# Patient Record
Sex: MALE | Employment: OTHER | URBAN - METROPOLITAN AREA
[De-identification: names, ages, dates, MRNs, and addresses within clinical notes are randomized per-mention and may not be internally consistent; named-entity substitution may affect disease eponyms.]

---

## 2018-08-04 ENCOUNTER — APPOINTMENT (OUTPATIENT)
Dept: GENERAL RADIOLOGY | Age: 78
End: 2018-08-04
Attending: NURSE PRACTITIONER
Payer: MEDICARE

## 2018-08-04 ENCOUNTER — HOSPITAL ENCOUNTER (EMERGENCY)
Age: 78
Discharge: HOME | End: 2018-08-04
Attending: EMERGENCY MEDICINE | Admitting: EMERGENCY MEDICINE
Payer: MEDICARE

## 2018-08-04 ENCOUNTER — APPOINTMENT (OUTPATIENT)
Dept: CT IMAGING | Age: 78
End: 2018-08-04
Attending: NURSE PRACTITIONER
Payer: MEDICARE

## 2018-08-04 VITALS
BODY MASS INDEX: 26.66 KG/M2 | HEART RATE: 73 BPM | WEIGHT: 180 LBS | SYSTOLIC BLOOD PRESSURE: 108 MMHG | TEMPERATURE: 97.5 F | RESPIRATION RATE: 16 BRPM | HEIGHT: 69 IN | DIASTOLIC BLOOD PRESSURE: 75 MMHG | OXYGEN SATURATION: 100 %
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
## 2018-08-04 DIAGNOSIS — S00.81XA ABRASION OF FOREHEAD, INITIAL ENCOUNTER: ICD-10-CM

## 2018-08-04 LAB
ALBUMIN SERPL-MCNC: 4.3 G/DL (ref 3.4–5)
ALBUMIN/GLOB SERPL: 0.9 {RATIO} (ref 0.8–1.7)
ALP SERPL-CCNC: 71 U/L (ref 45–117)
ALT SERPL-CCNC: 24 U/L (ref 16–61)
ANION GAP SERPL CALC-SCNC: 6 MMOL/L (ref 3–18)
AST SERPL-CCNC: 29 U/L (ref 15–37)
BASOPHILS # BLD: 0 K/UL (ref 0–0.1)
BASOPHILS NFR BLD: 0 % (ref 0–2)
BILIRUB DIRECT SERPL-MCNC: 0.2 MG/DL (ref 0–0.2)
BILIRUB SERPL-MCNC: 0.6 MG/DL (ref 0.2–1)
BUN SERPL-MCNC: 14 MG/DL (ref 7–18)
BUN/CREAT SERPL: 9 (ref 12–20)
CALCIUM SERPL-MCNC: 9.5 MG/DL (ref 8.5–10.1)
CHLORIDE SERPL-SCNC: 102 MMOL/L (ref 100–108)
CK MB CFR SERPL CALC: NORMAL % (ref 0–4)
CK MB SERPL-MCNC: <1 NG/ML (ref 5–25)
CK SERPL-CCNC: 101 U/L (ref 39–308)
CO2 SERPL-SCNC: 32 MMOL/L (ref 21–32)
CREAT SERPL-MCNC: 1.52 MG/DL (ref 0.6–1.3)
DIFFERENTIAL METHOD BLD: ABNORMAL
EOSINOPHIL # BLD: 0.1 K/UL (ref 0–0.4)
EOSINOPHIL NFR BLD: 3 % (ref 0–5)
ERYTHROCYTE [DISTWIDTH] IN BLOOD BY AUTOMATED COUNT: 12.6 % (ref 11.6–14.5)
GLOBULIN SER CALC-MCNC: 4.8 G/DL (ref 2–4)
GLUCOSE SERPL-MCNC: 129 MG/DL (ref 74–99)
HCT VFR BLD AUTO: 39.8 % (ref 36–48)
HGB BLD-MCNC: 14 G/DL (ref 13–16)
LYMPHOCYTES # BLD: 0.5 K/UL (ref 0.9–3.6)
LYMPHOCYTES NFR BLD: 15 % (ref 21–52)
MCH RBC QN AUTO: 29.7 PG (ref 24–34)
MCHC RBC AUTO-ENTMCNC: 35.2 G/DL (ref 31–37)
MCV RBC AUTO: 84.5 FL (ref 74–97)
MONOCYTES # BLD: 0.4 K/UL (ref 0.05–1.2)
MONOCYTES NFR BLD: 13 % (ref 3–10)
NEUTS SEG # BLD: 2.1 K/UL (ref 1.8–8)
NEUTS SEG NFR BLD: 69 % (ref 40–73)
PLATELET # BLD AUTO: 104 K/UL (ref 135–420)
PMV BLD AUTO: 9 FL (ref 9.2–11.8)
POTASSIUM SERPL-SCNC: 3.8 MMOL/L (ref 3.5–5.5)
PROT SERPL-MCNC: 9.1 G/DL (ref 6.4–8.2)
RBC # BLD AUTO: 4.71 M/UL (ref 4.7–5.5)
SODIUM SERPL-SCNC: 140 MMOL/L (ref 136–145)
TROPONIN I SERPL-MCNC: <0.02 NG/ML (ref 0–0.04)
WBC # BLD AUTO: 3 K/UL (ref 4.6–13.2)

## 2018-08-04 PROCEDURE — 82550 ASSAY OF CK (CPK): CPT | Performed by: NURSE PRACTITIONER

## 2018-08-04 PROCEDURE — 80076 HEPATIC FUNCTION PANEL: CPT | Performed by: NURSE PRACTITIONER

## 2018-08-04 PROCEDURE — 99284 EMERGENCY DEPT VISIT MOD MDM: CPT

## 2018-08-04 PROCEDURE — 80048 BASIC METABOLIC PNL TOTAL CA: CPT | Performed by: NURSE PRACTITIONER

## 2018-08-04 PROCEDURE — 74011250636 HC RX REV CODE- 250/636: Performed by: EMERGENCY MEDICINE

## 2018-08-04 PROCEDURE — 90471 IMMUNIZATION ADMIN: CPT

## 2018-08-04 PROCEDURE — 71045 X-RAY EXAM CHEST 1 VIEW: CPT

## 2018-08-04 PROCEDURE — 85025 COMPLETE CBC W/AUTO DIFF WBC: CPT | Performed by: NURSE PRACTITIONER

## 2018-08-04 PROCEDURE — 93005 ELECTROCARDIOGRAM TRACING: CPT

## 2018-08-04 PROCEDURE — 70450 CT HEAD/BRAIN W/O DYE: CPT

## 2018-08-04 PROCEDURE — 90715 TDAP VACCINE 7 YRS/> IM: CPT | Performed by: EMERGENCY MEDICINE

## 2018-08-04 PROCEDURE — 74011000250 HC RX REV CODE- 250: Performed by: EMERGENCY MEDICINE

## 2018-08-04 RX ORDER — BACITRACIN 500 UNIT/G
2 PACKET (EA) TOPICAL 3 TIMES DAILY
Status: DISCONTINUED | OUTPATIENT
Start: 2018-08-04 | End: 2018-08-04 | Stop reason: HOSPADM

## 2018-08-04 RX ADMIN — TETANUS TOXOID, REDUCED DIPHTHERIA TOXOID AND ACELLULAR PERTUSSIS VACCINE, ADSORBED 0.5 ML: 5; 2.5; 8; 8; 2.5 SUSPENSION INTRAMUSCULAR at 14:02

## 2018-08-04 RX ADMIN — BACITRACIN 2 PACKET: 500 OINTMENT TOPICAL at 15:30

## 2018-08-04 NOTE — ED PROVIDER NOTES
HPI Comments: History Provided By: Patient Chief Complaint: laceration Duration:  PTA Timing:  acute Location: left forehead Severity: mild Modifying Factors: PT fell and hit his head Associated Symptoms: Denies neck pain, CP, and SOB. 1:13 PM Jarett Beckford is a 66 y.o. male with h/o HTN and DM who presents to ED complaining of acute mild left forehead laceration that occurred this morning after a mechanical fall. Pt is at baseline and is acting normally. He goes to the [facility]. Denies neck pain, CP, and SOB. Pt does not smoke or drink. PCP: Aureliano Painting MD 
 
 
The history is provided by the patient. Past Medical History:  
Diagnosis Date  Diabetes ([address]  Hypertension  Kidney problem History reviewed. No pertinent surgical history. Family History:  
Problem Relation Age of Onset  Diabetes Other  Hypertension Other Social History Social History  Marital status:  Spouse name: N/A  
 Number of children: N/A  
 Years of education: N/A Occupational History  Not on file. Social History Main Topics  Smoking status: Never Smoker  Smokeless tobacco: Never Used  Alcohol use No  
 Drug use: Not on file  Sexual activity: Not on file Other Topics Concern  Not on file Social History Narrative ALLERGIES: Review of patient's allergies indicates no known allergies. Review of Systems Respiratory: Negative for shortness of breath. Cardiovascular: Negative for chest pain. Musculoskeletal: Negative for neck pain. Skin: Positive for wound. All other systems reviewed and are negative. Vitals:  
 08/04/18 1250 08/04/18 1300 08/04/18 1315 08/04/18 1330 BP: 131/72 154/73 133/73 108/75 Pulse:      
Resp:      
Temp:      
SpO2:  100% 100% 100% Weight:      
Height:      
      
 
Physical Exam  
Constitutional: He is oriented to person, place, and time. He appears well-developed and well-nourished. No distress. HENT:  
Head: Normocephalic and atraumatic. Mouth/Throat: Oropharynx is clear and moist.  
Eyes: Conjunctivae and EOM are normal. Pupils are equal, round, and reactive to light. No scleral icterus. Neck: Normal range of motion. Neck supple. Cardiovascular: Normal rate, regular rhythm and normal heart sounds. No murmur heard. Pulmonary/Chest: Effort normal and breath sounds normal. No respiratory distress. Abdominal: Soft. Bowel sounds are normal. He exhibits no distension. There is no tenderness. Musculoskeletal: He exhibits no edema. Lymphadenopathy:  
  He has no cervical adenopathy. Neurological: He is alert and oriented to person, place, and time. Coordination normal.  
Skin: Skin is warm and dry. No rash noted. 5 x 2 CM abrasion to the left forehead Psychiatric: He has a normal mood and affect. His behavior is normal.  
Nursing note and vitals reviewed. MDM Number of Diagnoses or Management Options Abrasion of forehead, initial encounter:  
Fall, initial encounter:  
Diagnosis management comments: Unwitnessed fall at baseline per care giver ct and labs reviewed will dc home Amount and/or Complexity of Data Reviewed Clinical lab tests: ordered and reviewed Tests in the radiology section of CPT®: ordered and reviewed ED Course Procedures Vitals: 
Patient Vitals for the past 12 hrs: 
 Temp Pulse Resp BP SpO2  
08/04/18 1330 - - - 108/75 100 % 08/04/18 1315 - - - 133/73 100 % 08/04/18 1300 - - - 154/73 100 % 08/04/18 1250 - - - 131/72 -  
08/04/18 1242 97.5 °F (36.4 °C) 73 16 150/83 98 % Medications Ordered: 
Medications diph,Pertuss(AC),Tet Vac-PF (BOOSTRIX) suspension 0.5 mL (0.5 mL IntraMUSCular Given 8/4/18 1402) Lab Findings: 
Recent Results (from the past 12 hour(s)) CBC WITH AUTOMATED DIFF Collection Time: 08/04/18 12:48 PM  
Result Value Ref Range WBC 3.0 (L) 4.6 - 13.2 K/uL  
 RBC 4.71 4.70 - 5.50 M/uL HGB 14.0 13.0 - 16.0 g/dL HCT 39.8 36.0 - 48.0 % MCV 84.5 74.0 - 97.0 FL  
 MCH 29.7 24.0 - 34.0 PG  
 MCHC 35.2 31.0 - 37.0 g/dL  
 RDW 12.6 11.6 - 14.5 % PLATELET 531 (L) 848 - 420 K/uL MPV 9.0 (L) 9.2 - 11.8 FL  
 NEUTROPHILS 69 40 - 73 % LYMPHOCYTES 15 (L) 21 - 52 % MONOCYTES 13 (H) 3 - 10 % EOSINOPHILS 3 0 - 5 % BASOPHILS 0 0 - 2 %  
 ABS. NEUTROPHILS 2.1 1.8 - 8.0 K/UL  
 ABS. LYMPHOCYTES 0.5 (L) 0.9 - 3.6 K/UL  
 ABS. MONOCYTES 0.4 0.05 - 1.2 K/UL  
 ABS. EOSINOPHILS 0.1 0.0 - 0.4 K/UL  
 ABS. BASOPHILS 0.0 0.0 - 0.1 K/UL  
 DF AUTOMATED METABOLIC PANEL, BASIC Collection Time: 08/04/18 12:48 PM  
Result Value Ref Range Sodium 140 136 - 145 mmol/L Potassium 3.8 3.5 - 5.5 mmol/L Chloride 102 100 - 108 mmol/L  
 CO2 32 21 - 32 mmol/L Anion gap 6 3.0 - 18 mmol/L Glucose 129 (H) 74 - 99 mg/dL BUN 14 7.0 - 18 MG/DL Creatinine 1.52 (H) 0.6 - 1.3 MG/DL  
 BUN/Creatinine ratio 9 (L) 12 - 20 GFR est AA 54 (L) >60 ml/min/1.73m2 GFR est non-AA 45 (L) >60 ml/min/1.73m2 Calcium 9.5 8.5 - 10.1 MG/DL  
CARDIAC PANEL,(CK, CKMB & TROPONIN) Collection Time: 08/04/18 12:48 PM  
Result Value Ref Range  39 - 308 U/L  
 CK - MB <1.0 <3.6 ng/ml CK-MB Index  0.0 - 4.0 % CALCULATION NOT PERFORMED WHEN RESULT IS BELOW LINEAR LIMIT Troponin-I, Qt. <0.02 0.0 - 0.045 NG/ML  
HEPATIC FUNCTION PANEL Collection Time: 08/04/18 12:48 PM  
Result Value Ref Range Protein, total 9.1 (H) 6.4 - 8.2 g/dL Albumin 4.3 3.4 - 5.0 g/dL Globulin 4.8 (H) 2.0 - 4.0 g/dL A-G Ratio 0.9 0.8 - 1.7 Bilirubin, total 0.6 0.2 - 1.0 MG/DL Bilirubin, direct 0.2 0.0 - 0.2 MG/DL Alk. phosphatase 71 45 - 117 U/L  
 AST (SGOT) 29 15 - 37 U/L  
 ALT (SGPT) 24 16 - 61 U/L  
EKG, 12 LEAD, INITIAL Collection Time: 08/04/18  1:04 PM  
Result Value Ref Range Ventricular Rate 71 BPM  
 Atrial Rate 71 BPM  
 P-R Interval 138 ms  QRS Duration 86 ms  
 Q-T Interval 384 ms QTC Calculation (Bezet) 417 ms Calculated P Axis 36 degrees Calculated R Axis 55 degrees Calculated T Axis 55 degrees Diagnosis Normal sinus rhythm Normal ECG When compared with ECG of 17-DEC-2012 07:31, 
Nonspecific T wave abnormality no longer evident in Inferior leads EKG Interpretation by ED physician: Interpreted by the EP. Time Interpreted: 1:15 PM 
 Rate: 71 Rhythm: Normal Sinus Rhythm Interpretation: No changes X-ray, CT or radiology findings or impressions: 
CT HEAD WO CONT IMPRESSION:  
 
No evidence of acute intracranial process. XR CHEST PORT IMPRESSION:  
 
No acute appearing abnormality. Left-sided pleural plaques. Progress notes, consult notes, or additional procedure notes: 
 
Diagnosis: 1. Fall, initial encounter 2. Abrasion of forehead, initial encounter Disposition: home Follow-up Information Follow up With Details Comments Contact Info [facility] EMERGENCY DEPT   [address] 
[phone] Patient's Medications Start Taking No medications on file Continue Taking ATENOLOL PO    Take  by mouth. CLONIDINE (CATAPRES) 0.1 MG TABLET    Take 1 Tab by mouth three (3) times daily. GLYBURIDE    by Does Not Apply route. MULTIVITAMIN (ONE A DAY) TABLET    Take 1 Tab by mouth daily. OMEPRAZOLE PO    Take  by mouth. SIMVASTATIN, BULK,    by Does Not Apply route. These Medications have changed No medications on file Stop Taking No medications on file Scribe Attestation Shanel Moreno acting as a scribe for and in the presence of Jessica Schofield MD     
August 04, 2018 at 1:15 PM 
    
Provider Attestation:     
I personally performed the services described in the documentation, reviewed the documentation, as recorded by the scribe in my presence, and it accurately and completely records my words and actions.  August 04, 2018 at 1:15 PM - Dexter Hernández MD

## 2018-08-04 NOTE — PROGRESS NOTES
Spoke with patient at the request of Dr. Brittany Barrett. He needs follow up care. He does have connection to the [place] but they have told him he will need to use his Medicare Part B for PCP, meds at the [place]. Verified contact information on facesheet and sent referral to  via email for follow up Monday. I also provided  contact information to patient.

## 2018-08-04 NOTE — ED TRIAGE NOTES
Guardian states she arrived to the house today, patient has an abrasion to his L forehead, unwitnessed fall last night from his bed (per the patient), negative stroke symptoms, guardian states his speech is normal for him

## 2018-08-04 NOTE — DISCHARGE INSTRUCTIONS
Scrapes (Abrasions): Care Instructions  Your Care Instructions  Scrapes (abrasions) are wounds where your skin has been rubbed or torn off. Most scrapes do not go deep into the skin, but some may remove several layers of skin. Scrapes usually don't bleed much, but they may ooze pinkish fluid. Scrapes on the head or face may appear worse than they are. They may bleed a lot because of the good blood supply to this area. Most scrapes heal well and may not need a bandage. They usually heal within 3 to 7 days. A large, deep scrape may take 1 to 2 weeks or longer to heal. A scab may form on some scrapes. Follow-up care is a key part of your treatment and safety. Be sure to make and go to all appointments, and call your doctor if you are having problems. It's also a good idea to know your test results and keep a list of the medicines you take. How can you care for yourself at home? · If your doctor told you how to care for your wound, follow your doctor's instructions. If you did not get instructions, follow this general advice:  ¨ Wash the scrape with clean water 2 times a day. Don't use hydrogen peroxide or alcohol, which can slow healing. ¨ You may cover the scrape with a thin layer of petroleum jelly, such as Vaseline, and a nonstick bandage. ¨ Apply more petroleum jelly and replace the bandage as needed. · Prop up the injured area on a pillow anytime you sit or lie down during the next 3 days. Try to keep it above the level of your heart. This will help reduce swelling. · Be safe with medicines. Take pain medicines exactly as directed. ¨ If the doctor gave you a prescription medicine for pain, take it as prescribed. ¨ If you are not taking a prescription pain medicine, ask your doctor if you can take an over-the-counter medicine. When should you call for help?   Call your doctor now or seek immediate medical care if:    · You have signs of infection, such as:  ¨ Increased pain, swelling, warmth, or redness around the scrape. ¨ Red streaks leading from the scrape. ¨ Pus draining from the scrape. ¨ A fever.     · The scrape starts to bleed, and blood soaks through the bandage. Oozing small amounts of blood is normal.    Watch closely for changes in your health, and be sure to contact your doctor if the scrape is not getting better each day. Where can you learn more? Go to http://nikky-terry.info/. Enter A374 in the search box to learn more about \"Scrapes (Abrasions): Care Instructions. \"  Current as of: November 20, 2017  Content Version: 11.7  © 9343-1237 [employer]. Care instructions adapted under license by [employer] (which disclaims liability or warranty for this information). If you have questions about a medical condition or this instruction, always ask your healthcare professional. [place] [address] any warranty or liability for your use of this information. Preventing Falls: Care Instructions  Your Care Instructions    Getting around your home safely can be a challenge if you have injuries or health problems that make it easy for you to fall. Loose rugs and furniture in walkways are among the dangers for many older people who have problems walking or who have poor eyesight. People who have conditions such as arthritis, osteoporosis, or dementia also have to be careful not to fall. You can make your home safer with a few simple measures. Follow-up care is a key part of your treatment and safety. Be sure to make and go to all appointments, and call your doctor if you are having problems. It's also a good idea to know your test results and keep a list of the medicines you take. How can you care for yourself at home? Taking care of yourself  · You may get dizzy if you do not drink enough water. To prevent dehydration, drink plenty of fluids, enough so that your urine is light yellow or clear like water.  Choose water and other caffeine-free clear liquids. If you have kidney, heart, or liver disease and have to limit fluids, talk with your doctor before you increase the amount of fluids you drink. · Exercise regularly to improve your strength, muscle tone, and balance. Walk if you can. Swimming may be a good choice if you cannot walk easily. · Have your vision and hearing checked each year or any time you notice a change. If you have trouble seeing and hearing, you might not be able to avoid objects and could lose your balance. · Know the side effects of the medicines you take. Ask your doctor or pharmacist whether the medicines you take can affect your balance. Sleeping pills or sedatives can affect your balance. · Limit the amount of alcohol you drink. Alcohol can impair your balance and other senses. · Ask your doctor whether calluses or corns on your feet need to be removed. If you wear loose-fitting shoes because of calluses or corns, you can lose your balance and fall. · Talk to your doctor if you have numbness in your feet. Preventing falls at home  · Remove raised doorway thresholds, throw rugs, and clutter. Repair loose carpet or raised areas in the floor. · Move furniture and electrical cords to keep them out of walking paths. · Use nonskid floor wax, and wipe up spills right away, especially on ceramic tile floors. · If you use a walker or cane, put rubber tips on it. If you use crutches, clean the bottoms of them regularly with an abrasive pad, such as steel wool. · Keep your house well lit, especially [place], and outside walkways. Use night-lights in areas such as hallways and bathrooms. Add extra light switches or use remote switches (such as switches that go on or off when you clap your hands) to make it easier to turn lights on if you have to get up during the night. · Install sturdy handrails on stairways.   · Move items in your cabinets so that the things you use a lot are on the lower shelves (about waist level). · Keep a cordless phone and a flashlight with new batteries by your bed. If possible, put a phone in each of the main rooms of your house, or carry a cell phone in case you fall and cannot reach a phone. Or, you can wear a device around your neck or wrist. You push a button that sends a signal for help. · Wear low-heeled shoes that fit well and give your feet good support. Use footwear with nonskid soles. Check the heels and soles of your shoes for wear. Repair or replace worn heels or soles. · Do not wear socks without shoes on wood floors. · Walk on the grass when the sidewalks are slippery. If you live in an area that gets snow and ice in the winter, sprinkle salt on slippery steps and sidewalks. Preventing falls in the bath  · Install grab bars and nonskid mats inside and outside your shower or tub and near the toilet and sinks. · Use shower chairs and bath benches. · Use a hand-held shower head that will allow you to sit while showering. · Get into a tub or shower by putting the weaker leg in first. Get out of a tub or shower with your strong side first.  · Repair loose toilet seats and consider installing a raised toilet seat to make getting on and off the toilet easier. · Keep your bathroom door unlocked while you are in the shower. Where can you learn more? Go to http://nikky-terry.info/. Enter 0476 [id number] 69 [id number] in the search box to learn more about \"Preventing Falls: Care Instructions. \"  Current as of: May 12, 2017  Content Version: 11.7  © 0390-1865 [employer]. Care instructions adapted under license by [employer] (which disclaims liability or warranty for this information). If you have questions about a medical condition or this instruction, always ask your healthcare professional. [address] any warranty or liability for your use of this information.

## 2018-08-05 LAB
ATRIAL RATE: 71 BPM
CALCULATED P AXIS, ECG09: 36 DEGREES
CALCULATED R AXIS, ECG10: 55 DEGREES
CALCULATED T AXIS, ECG11: 55 DEGREES
DIAGNOSIS, 93000: NORMAL
P-R INTERVAL, ECG05: 138 MS
Q-T INTERVAL, ECG07: 384 MS
QRS DURATION, ECG06: 86 MS
QTC CALCULATION (BEZET), ECG08: 417 MS
VENTRICULAR RATE, ECG03: 71 BPM

## 2018-08-21 ENCOUNTER — OFFICE VISIT (OUTPATIENT)
Dept: FAMILY MEDICINE CLINIC | Age: 78
End: 2018-08-21

## 2018-08-21 VITALS
SYSTOLIC BLOOD PRESSURE: 155 MMHG | RESPIRATION RATE: 14 BRPM | HEIGHT: 68 IN | HEART RATE: 59 BPM | WEIGHT: 137.6 LBS | BODY MASS INDEX: 20.85 KG/M2 | TEMPERATURE: 96.1 F | DIASTOLIC BLOOD PRESSURE: 76 MMHG

## 2018-08-21 DIAGNOSIS — I10 ESSENTIAL HYPERTENSION: ICD-10-CM

## 2018-08-21 DIAGNOSIS — G20 PARKINSON'S SYNDROME (HCC): Primary | ICD-10-CM

## 2018-08-21 DIAGNOSIS — R25.1 TREMOR: ICD-10-CM

## 2018-08-21 DIAGNOSIS — T14.8XXA ABRASION: ICD-10-CM

## 2018-08-21 DIAGNOSIS — R73.9 ELEVATED BLOOD SUGAR: ICD-10-CM

## 2018-08-21 LAB — HBA1C MFR BLD HPLC: 5.6 %

## 2018-08-21 RX ORDER — AMLODIPINE BESYLATE 5 MG/1
5 TABLET ORAL DAILY
Qty: 30 TAB | Refills: 1 | Status: SHIPPED | OUTPATIENT
Start: 2018-08-21 | End: 2018-10-15 | Stop reason: SDUPTHER

## 2018-08-21 RX ORDER — CARBIDOPA AND LEVODOPA 25; 100 MG/1; MG/1
1 TABLET ORAL 3 TIMES DAILY
Qty: 90 TAB | Refills: 1 | Status: SHIPPED | OUTPATIENT
Start: 2018-08-21 | End: 2018-11-05 | Stop reason: SDUPTHER

## 2018-08-21 NOTE — PROGRESS NOTES
Chief Complaint   Patient presents with   [facility]     1. Have you been to the ER, urgent care clinic since your last visit? Hospitalized since your last visit? [place] 1 week ago for injury from fall. 2. Have you seen or consulted any other health care providers outside of the [facility] since your last visit? Include any pap smears or colon screening. No    Fall Risk Assessment, last 12 mths 8/21/2018   Able to walk? Yes   Fall in past 12 months? Yes   Fall with injury? Yes   Number of falls in past 12 months 1   Fall Risk Score 2     Patient would like to discuss tremors of his hands.     Visit Vitals    /76 (BP 1 Location: Left arm, BP Patient Position: Sitting)    Pulse (!) 59    Temp 96.1 °F (35.6 °C) (Oral)    Resp 14    Ht 5' 8\" (1.727 m)    Wt 137 lb 9.6 oz (62.4 kg)    BMI 20.92 kg/m2

## 2018-08-21 NOTE — PATIENT INSTRUCTIONS
Parkinson's Disease: Care Instructions  Your Care Instructions  Parkinson's disease can cause tremors, stiffness, and problems with movement. Severe or advanced cases can also cause problems with thinking. In Parkinson's disease, part of the brain cannot make enough dopamine, a chemical that helps control movement. Taking your medicines correctly and getting regular exercise may help you maintain your quality of life. There are many things that can cause Parkinson's disease symptoms, including some medicine, some toxins, and trauma to the head. The cause in most cases is not known. Follow-up care is a key part of your treatment and safety. Be sure to make and go to all appointments, and call your doctor if you are having problems. It's also a good idea to know your test results and keep a list of the medicines you take. How can you care for yourself at home? General care  · Take your medicines exactly as prescribed. Call your doctor if you think you are having a problem with your medicine. · Make sure your home is safe:  ¨ Place furniture so that you have something to hold on to as you walk around the house. ¨ Use chairs that make it easier to sit down and stand up. ¨ Group the things you use most, such as reading glasses, keys, and the telephone, in one easy-to-reach place. ¨ Tack down rugs so that you do not trip. ¨ Put no-slip tape and handrails in the tub to prevent falls. · Use a cane, walker, or scooter if your doctor suggests it. · Keep up your normal activities as much as you can. · Find ways to manage stress, which can make symptoms worse. · Spend time with family and friends. Join a support group for people with Parkinson's disease if you want extra help. · Depression is common with this condition. Tell your doctor if you have trouble sleeping, are eating too much or are not hungry, or feel sad or tearful all the time. Depression can be treated with medicine and counseling.   Diet and exercise  · Eat a balanced diet. · If you are taking levodopa, do not eat protein at the same time you take your medicine. Levodopa may not work as well if you take it at the same time you eat protein. You can eat normal amounts of protein. Talk to your doctor if you have questions. · If you have problems swallowing, change how and what you eat:  ¨ Try thick drinks, such as milk shakes. They are easier to swallow than other fluids. ¨ Do not eat foods that crumble easily. These can cause choking. ¨ Use a  to prepare food. Soft foods need less chewing. ¨ Eat small meals often so that you do not get tired from eating heavy meals. · Drink plenty of water and eat a high-fiber diet to prevent constipation. Parkinson's-and the medicines that treat it-may slow your intestines. · Get exercise on most days. Work with your doctor to set up a program of walking, swimming, or other exercise you are able to do. When should you call for help? Call your doctor now or seek immediate medical care if:    · You have a change in your symptoms.     · You develop other problems from your condition, such as:  ¨ Injury from a fall. ¨ Thinking or memory problems. ¨ A urinary tract infection (burning pain when urinating).    Watch closely for changes in your health, and be sure to contact your doctor if:    · You lose weight because of problems with eating.     · You want more information about your condition or your medicines. Where can you learn more? Go to http://nikky-terry.info/. Enter A850 in the search box to learn more about \"Parkinson's Disease: Care Instructions. \"  Current as of: October 9, 2017  Content Version: 11.7  © 7318-7034 [employer]. Care instructions adapted under license by [employer] (which disclaims liability or warranty for this information).  If you have questions about a medical condition or this instruction, always ask your healthcare professional. [employer] disclaims any warranty or liability for your use of this information. Movement Disorders: Exercises  Your Care Instructions  Here are some examples of exercises for problems with movement. Your doctor or physical therapist will tell you when you can start these exercises and which ones will work best for you. Problems with movement, or movement disorders, can happen along with many conditions, such as multiple sclerosis, Parkinson's disease, or damage from a stroke. No matter what is making movement hard for you, these exercises can help you be more flexible, strong, and steady on your feet. Start each exercise slowly. Ease off the exercise if you start to have pain. How to do the exercises  Prayer stretch    1. Start with your palms together in front of your chest, just below your chin. 2. Slowly lower your hands toward your waistline, keeping your hands close to your stomach and your palms together until you feel a mild to moderate stretch under your forearms. 3. Hold for at least 15 to 30 seconds. Repeat 2 to 4 times. Shoulder stretch    1.  a doorway, and place one arm against the door frame. Your elbow should be a little higher than your shoulder. 2. Relax your shoulders as you lean forward, allowing your chest and shoulder muscles to stretch. You can also turn your body slightly away from your arm to stretch the muscles even more. 3. Hold for 15 to 30 seconds. 4. Repeat 2 to 4 times with each arm. Calf stretch    1. Place your hands on a wall for balance. You can also do this with your hands on the back of a chair or countertop. 2. Step back with your right leg. Keep the leg straight, and press your right heel into the floor. 3. Press your hips forward, bending your left leg slightly. You will feel the stretch in your right calf. 4. Hold the stretch 15 to 30 seconds. 5. Repeat 2 to 4 times with each leg. Hip flexor stretch (edge of table)    1.  Lie flat on your back on a table or flat bench, with your knees and lower legs hanging off the edge of the table. 2. Grab one leg at the knee, and pull that knee back toward your chest. Relax the other leg. Let it hang down toward the floor until you feel a stretch in the upper thigh of that leg and hip. 3. Hold the stretch for at least 15 to 30 seconds. 4. Repeat 2 to 4 times for each leg. Back stretches    1. Get down on your hands and knees on the floor. 2. Relax your head, and allow it to droop. Round your back up toward the ceiling until you feel a nice stretch in your upper, middle, and lower back. Hold this stretch for as long as it feels comfortable, or about 15 to 30 seconds. 3. Return to the starting position with a flat back while you are on your hands and knees. 4. Let your back sway by pressing your stomach toward the floor. Lift your buttocks toward the ceiling. 5. Hold this position for 15 to 30 seconds. 6. Repeat 2 to 4 times. Midback stretch    If you have knee pain, do not do this exercise. 1. Kneel on the floor, and sit back on your ankles. 2. Lean forward, place your hands on the floor, and stretch your arms out in front of you. Rest your head between your arms. 3. Gently push your chest toward the floor, reaching as far in front of you as you can. 4. Hold for 15 to 30 seconds. 5. Repeat 2 to 4 times. Press-up stretch    1. Lie on your stomach, supporting your body with your forearms. 2. Press your elbows down into the floor to raise your upper back. As you do this, relax your stomach muscles and allow your back to arch without using your back muscles. As you press up, do not let your hips or pelvis come off the floor. 3. Hold for 15 to 30 seconds, then relax. 4. Repeat 2 to 4 times. Chest and shoulder stretches    1. Put a few towels on top of one another and roll them together lengthwise.   2. Lie down, and place the roll of towels along the bones of your spine from your neck to your tailbone. Or lie on a foam roller if you have one. 3. Make sure that your head and tailbone area are supported with the roll of towels or on the foam roller. Be sure the towel roll or foam roller is in line with your spine. 4. Bend your knees to support your lower back. 5. Hold this position while you move your arms into the following positions:  1. Arms down at your sides, with the palms facing up. 2. Arms out to your sides in a \"T\" shape. 3. Arms out to your sides with your elbows bent to 90 degrees, as in a \"goalpost\" shape. 4. Arms stretched over your head. 6. Hold each arm position for 15 to 30 seconds. 7. Repeat the entire cycle of arm movements 2 to 4 times. Single knee-to-chest stretch    1. Lie on your back with your knees bent and your feet flat on the floor. You can put a small pillow under your head and neck if it is more comfortable. 2. Bring one knee to your chest, keeping the other foot flat on the floor. 3. Keep your lower back pressed to the floor. Hold for 15 to 30 seconds. 4. Relax, and lower the knee to the starting position. 5. Repeat with the other leg. Repeat 2 to 4 times with each leg. 6. To get more stretch, keep your other leg flat on the floor while pulling your knee to your chest.  Hip rotator stretch    1. Lie on your back with both knees bent and your feet flat on the floor. 2. Put the ankle of one leg on your opposite thigh near your knee. 3. Use your hand to gently push the raised knee away from your body until you feel a gentle stretch around your hip. 4. Hold the stretch for 15 to 30 seconds. 5. Repeat 2 to 4 times with each leg. Hamstring wall stretch    1. Lie on your back in a doorway, with your lower legs through the open door. 2. Slide the leg next to the doorway up the wall to straighten your knee. You should feel a gentle stretch down the back of your leg. 1. Do not arch your back. 2. Do not bend either knee.   3. Keep one heel touching the floor and the other heel touching the wall. Do not point your toes. 3. Hold the stretch for at least 1 minute to start. As you get used to it, work toward holding the stretch for as long as 6 minutes. 4. Do this exercise 2 to 4 times with one leg. Then move to the other side of the doorway to stretch the other leg. Hand flips for coordination    1. While seated, place your forearm and wrist on your thigh, palm down. 2. Flip your hand over so the back of your hand rests on your thigh and your palm is up. Alternate between palm up and palm down while keeping your forearm on your thigh. 3. Repeat 8 to 12 times with each hand. Finger opposition for coordination    1. With one hand, point your fingers and thumb straight up. Your wrist should be relaxed, following the line of your fingers and thumb. 2. Touch your thumb to each finger, one finger at a time. This will look like an \"okay\" sign, but try to keep your other fingers straight and pointing upward as much as you can. 3. Repeat 8 to 12 times with each hand. Finger extension for coordination    1. Place your hand flat on a table. 2. Lift and then lower one finger at a time off the table. 3. Repeat 8 to 12 times with each hand. Shoulder blade squeeze for strength    1. Stand with your arms at your sides, and squeeze your shoulder blades together. Do not raise your shoulders up as you squeeze. 2. Hold 6 seconds. 3. Repeat 8 to 12 times. Bridging for strength    1. Lie on your back with both knees bent. Your knees should be bent about 90 degrees. 2. Then push your feet into the floor, squeeze your buttocks, and lift your hips off the floor until your shoulders, hips, and knees are all in a straight line. 3. Hold for about 6 seconds as you continue to breathe normally. 4. Slowly lower your hips back down to the floor. Rest for up to 10 seconds. 5. Repeat 8 to 12 times. Single-leg balance    1.  Stand on a flat surface with your arms stretched out to your sides like you are making the letter \"T. \" Then lift one leg off the floor, bending it at the knee. If you are not steady on your feet, use one hand to hold on to a chair, counter, or wall. 2. Keep your standing knee straight. Try to balance on that leg for up to 30 seconds. Then rest for up to 10 seconds. 3. Repeat 6 to 8 times with each leg. 4. When you can balance on one leg for 30 seconds with your eyes open, try to balance on it with your eyes closed. 5. When you can do this exercise with your eyes closed for 30 seconds and with ease and no pain, try it while standing on a pillow or piece of foam.  Alternate arm and leg (bird dog) balance    Do this exercise slowly. Try to keep your body straight at all times. 1. Start on the floor, on your hands and knees. 2. Tighten your belly muscles by pulling your belly button in toward your spine. Be sure you continue to breathe normally and do not hold your breath. 3. Raise one arm off the floor, and hold it straight out in front of you. Be careful not to let your shoulder drop down, because that will twist your trunk. 4. Hold for about 6 seconds, then lower your arm and switch to your other arm. 5. Repeat 8 to 12 times with each arm. 6. When you can do this exercise with ease and no pain, try it with one leg raised off the floor. Hold your leg straight out behind you. Be careful not to let your hip drop down, because that will twist your trunk. 7. When holding your leg straight out becomes easier, try raising your opposite arm at the same time. Repeat steps 1 through 5. Balance-building exercise 1    1. Stand with a chair in front of you and a wall behind you, in case you lose your balance. 2. Stand with your feet together and your arms at your sides. 3. Move your head up and down 10 times. Balance-building exercise 2    1. Turn your head side to side 10 times. Balance-building exercise 3    1. Move your head diagonally up and down 10 times.   Balance-building exercise 4    1. Move your head diagonally up and down 10 times on the other side. Follow-up care is a key part of your treatment and safety. Be sure to make and go to all appointments, and call your doctor if you are having problems. It's also a good idea to know your test results and keep a list of the medicines you take. Where can you learn more? Go to http://nikky-terry.info/. Enter R814 in the search box to learn more about \"Movement Disorders: Exercises. \"  Current as of: October 9, 2017  Content Version: 11.7  © 3580-5580 [employer]. Care instructions adapted under license by [employer] (which disclaims liability or warranty for this information). If you have questions about a medical condition or this instruction, always ask your healthcare professional. [address] any warranty or liability for your use of this information.

## 2018-08-21 NOTE — MR AVS SNAPSHOT
Nikki Iyer [place] [address] [facility] [address]. [address] [facility] 83 [id number] 
[phone] Patient: Celeste Patel MRN: [id number] DNP:[birth year] Visit Information Date & Time Provider Department Dept. Phone Encounter #  
 8/21/2018  3:30 PM Fran Syed, [address] [facility] [phone] [id number] Follow-up Instructions Return in about 1 month (around 9/21/2018) for Needs appointment for fasting blood work. Upcoming Health Maintenance Date Due DTaP/Tdap/Td series (1 - Tdap) 4/30/1961 ZOSTER VACCINE AGE 60> 2/29/2000 GLAUCOMA SCREENING Q2Y 4/30/2005 Bone Densitometry (Dexa) Screening 4/30/2005 Pneumococcal 65+ Low/Medium Risk (1 of 2 - PCV13) 4/30/2005 Influenza Age 5 to Adult 8/1/2018 MEDICARE YEARLY EXAM 8/21/2018 Allergies as of 8/21/2018  Review Complete On: 8/21/2018 By: Fran Syed MD  
 No Known Allergies Current Immunizations  Never Reviewed No immunizations on file. Not reviewed this visit You Were Diagnosed With   
  
 Codes Comments Elevated blood sugar    -  Primary ICD-10-CM: R73.9 ICD-9-CM: 790.29 Abrasion     ICD-10-CM: T14. [place] ICD-9-CM: 919.0 Tremor     ICD-10-CM: R25.1 ICD-9-CM: 781.0 Essential hypertension     ICD-10-CM: I10 
ICD-9-CM: 401.9 Parkinson's syndrome ([address]     ICD-10-CM: G20 
ICD-9-CM: 332.0 Vitals BP Pulse Temp Resp Height(growth percentile) Weight(growth percentile) 155/76 (BP 1 Location: Left arm, BP Patient Position: Sitting) (!) 59 96.1 °F (35.6 °C) (Oral) 14 5' 8\" (1.727 m) 137 lb 9.6 oz (62.4 kg) BMI Smoking Status 20.92 kg/m2 Never Smoker Vitals History BMI and BSA Data Body Mass Index Body Surface Area  
 20.92 kg/m 2 1.73 m 2 Preferred Pharmacy Pharmacy Name Phone [facility] [place], [address] [phone] Your Updated Medication List  
  
   
This list is accurate as of 8/21/18  4:22 PM.  Always use your most recent med list. amLODIPine 5 mg tablet Commonly known as:  Izetta Harder Take 1 Tab by mouth daily. carbidopa-levodopa  mg per tablet Commonly known as:  SINEMET Take 1 Tab by mouth three (3) times daily. Prescriptions Sent to Pharmacy Refills  
 carbidopa-levodopa (SINEMET)  mg per tablet 1 Sig: Take 1 Tab by mouth three (3) times daily. Class: Normal  
 [facility]acy: [facility] [address] [place] [address], [address] [place] [address] #: [phone] Route: Oral  
 amLODIPine (NORVASC) 5 mg tablet 1 Sig: Take 1 Tab by mouth daily. Class: Normal  
 Pharmacy: [facility] [address] [place] [address], [address] [place] [address] Ph #: [phone] Route: Oral  
  
We Performed the Following AMB POC HEMOGLOBIN A1C [66613 CPT(R)] Follow-up Instructions Return in about 1 month (around 9/21/2018) for Needs appointment for fasting blood work. To-Do List   
 08/21/2018 Lab:  LIPID PANEL Patient Instructions Parkinson's Disease: Care Instructions Your Care Instructions Parkinson's disease can cause tremors, stiffness, and problems with movement. Severe or advanced cases can also cause problems with thinking. In Parkinson's disease, part of the brain cannot make enough dopamine, a chemical that helps control movement. Taking your medicines correctly and getting regular exercise may help you maintain your quality of life. There are many things that can cause Parkinson's disease symptoms, including some medicine, some toxins, and trauma to the head. The cause in most cases is not known. Follow-up care is a key part of your treatment and safety.  Be sure to make and go to all appointments, and call your doctor if you are having problems. It's also a good idea to know your test results and keep a list of the medicines you take. How can you care for yourself at home? General care · Take your medicines exactly as prescribed. Call your doctor if you think you are having a problem with your medicine. · Make sure your home is safe: 
¨ Place furniture so that you have something to hold on to as you walk around the house. ¨ Use chairs that make it easier to sit down and stand up. ¨ Group the things you use most, such as reading glasses, keys, and the telephone, in one easy-to-reach place. ¨ Tack down rugs so that you do not trip. ¨ Put no-slip tape and handrails in the tub to prevent falls. · Use a cane, walker, or scooter if your doctor suggests it. · Keep up your normal activities as much as you can. · Find ways to manage stress, which can make symptoms worse. · Spend time with family and friends. Join a support group for people with Parkinson's disease if you want extra help. · Depression is common with this condition. Tell your doctor if you have trouble sleeping, are eating too much or are not hungry, or feel sad or tearful all the time. Depression can be treated with medicine and counseling. Diet and exercise · Eat a balanced diet. · If you are taking levodopa, do not eat protein at the same time you take your medicine. Levodopa may not work as well if you take it at the same time you eat protein. You can eat normal amounts of protein. Talk to your doctor if you have questions. · If you have problems swallowing, change how and what you eat: ¨ Try thick drinks, such as milk shakes. They are easier to swallow than other fluids. ¨ Do not eat foods that crumble easily. These can cause choking. ¨ Use a  to prepare food. Soft foods need less chewing. ¨ Eat small meals often so that you do not get tired from eating heavy meals. · Drink plenty of water and eat a high-fiber diet to prevent constipation. Parkinson's-and the medicines that treat it-may slow your intestines. · Get exercise on most days. Work with your doctor to set up a program of walking, swimming, or other exercise you are able to do. When should you call for help? Call your doctor now or seek immediate medical care if: 
  · You have a change in your symptoms.  
  · You develop other problems from your condition, such as: ¨ Injury from a fall. ¨ Thinking or memory problems. ¨ A urinary tract infection (burning pain when urinating).  
 Watch closely for changes in your health, and be sure to contact your doctor if: 
  · You lose weight because of problems with eating.  
  · You want more information about your condition or your medicines. Where can you learn more? Go to http://nikky-terry.info/. Enter Y898 in the search box to learn more about \"Parkinson's Disease: Care Instructions. \" Current as of: October 9, 2017 Content Version: 11.7 © 4894-3201 [employer]. Care instructions adapted under license by [employer] (which disclaims liability or warranty for this information). If you have questions about a medical condition or this instruction, always ask your healthcare professional. [address] any warranty or liability for your use of this information. Movement Disorders: Exercises Your Care Instructions Here are some examples of exercises for problems with movement. Your doctor or physical therapist will tell you when you can start these exercises and which ones will work best for you. Problems with movement, or movement disorders, can happen along with many conditions, such as multiple sclerosis, Parkinson's disease, or damage from a stroke. No matter what is making movement hard for you, these exercises can help you be more flexible, strong, and steady on your feet. Start each exercise slowly. Ease off the exercise if you start to have pain. How to do the exercises Prayer stretch 1. Start with your palms together in front of your chest, just below your chin. 2. Slowly lower your hands toward your waistline, keeping your hands close to your stomach and your palms together until you feel a mild to moderate stretch under your forearms. 3. Hold for at least 15 to 30 seconds. Repeat 2 to 4 times. Shoulder stretch 1.  a doorway, and place one arm against the door frame. Your elbow should be a little higher than your shoulder. 2. Relax your shoulders as you lean forward, allowing your chest and shoulder muscles to stretch. You can also turn your body slightly away from your arm to stretch the muscles even more. 3. Hold for 15 to 30 seconds. 4. Repeat 2 to 4 times with each arm. Calf stretch 1. Place your hands on a wall for balance. You can also do this with your hands on the back of a chair or countertop. 2. Step back with your right leg. Keep the leg straight, and press your right heel into the floor. 3. Press your hips forward, bending your left leg slightly. You will feel the stretch in your right calf. 4. Hold the stretch 15 to 30 seconds. 5. Repeat 2 to 4 times with each leg. Hip flexor stretch (edge of table) 1. Lie flat on your back on a table or flat bench, with your knees and lower legs hanging off the edge of the table. 2. Grab one leg at the knee, and pull that knee back toward your chest. Relax the other leg. Let it hang down toward the floor until you feel a stretch in the upper thigh of that leg and hip. 3. Hold the stretch for at least 15 to 30 seconds. 4. Repeat 2 to 4 times for each leg. Back stretches 1. Get down on your hands and knees on the floor. 2. Relax your head, and allow it to droop. Round your back up toward the ceiling until you feel a nice stretch in your upper, middle, and lower back. Hold this stretch for as long as it feels comfortable, or about 15 to 30 seconds. 3. Return to the starting position with a flat back while you are on your hands and knees. 4. Let your back sway by pressing your stomach toward the floor. Lift your buttocks toward the ceiling. 5. Hold this position for 15 to 30 seconds. 6. Repeat 2 to 4 times. Midback stretch If you have knee pain, do not do this exercise. 1. Kneel on the floor, and sit back on your ankles. 2. Lean forward, place your hands on the floor, and stretch your arms out in front of you. Rest your head between your arms. 3. Gently push your chest toward the floor, reaching as far in front of you as you can. 4. Hold for 15 to 30 seconds. 5. Repeat 2 to 4 times. Press-up stretch 1. Lie on your stomach, supporting your body with your forearms. 2. Press your elbows down into the floor to raise your upper back. As you do this, relax your stomach muscles and allow your back to arch without using your back muscles. As you press up, do not let your hips or pelvis come off the floor. 3. Hold for 15 to 30 seconds, then relax. 4. Repeat 2 to 4 times. Chest and shoulder stretches 1. Put a few towels on top of one another and roll them together lengthwise. 2. Lie down, and place the roll of towels along the bones of your spine from your neck to your tailbone. Or lie on a foam roller if you have one. 3. Make sure that your head and tailbone area are supported with the roll of towels or on the foam roller. Be sure the towel roll or foam roller is in line with your spine. 4. Bend your knees to support your lower back. 5. Hold this position while you move your arms into the following positions: 1. Arms down at your sides, with the palms facing up. 2. Arms out to your sides in a \"T\" shape. 3. Arms out to your sides with your elbows bent to 90 degrees, as in a \"goalpost\" shape. 4. Arms stretched over your head. 6. Hold each arm position for 15 to 30 seconds. 7. Repeat the entire cycle of arm movements 2 to 4 times. Single knee-to-chest stretch 1. Lie on your back with your knees bent and your feet flat on the floor. You can put a small pillow under your head and neck if it is more comfortable. 2. Bring one knee to your chest, keeping the other foot flat on the floor. 3. Keep your lower back pressed to the floor. Hold for 15 to 30 seconds. 4. Relax, and lower the knee to the starting position. 5. Repeat with the other leg. Repeat 2 to 4 times with each leg. 6. To get more stretch, keep your other leg flat on the floor while pulling your knee to your chest. 
Hip rotator stretch 1. Lie on your back with both knees bent and your feet flat on the floor. 2. Put the ankle of one leg on your opposite thigh near your knee. 3. Use your hand to gently push the raised knee away from your body until you feel a gentle stretch around your hip. 4. Hold the stretch for 15 to 30 seconds. 5. Repeat 2 to 4 times with each leg. Hamstring wall stretch 1. Lie on your back in a doorway, with your lower legs through the open door. 2. Slide the leg next to the doorway up the wall to straighten your knee. You should feel a gentle stretch down the back of your leg. 1. Do not arch your back. 2. Do not bend either knee. 3. Keep one heel touching the floor and the other heel touching the wall. Do not point your toes. 3. Hold the stretch for at least 1 minute to start. As you get used to it, work toward holding the stretch for as long as 6 minutes. 4. Do this exercise 2 to 4 times with one leg. Then move to the other side of the doorway to stretch the other leg. Hand flips for coordination 1. While seated, place your forearm and wrist on your thigh, palm down. 2. Flip your hand over so the back of your hand rests on your thigh and your palm is up. Alternate between palm up and palm down while keeping your forearm on your thigh. 3. Repeat 8 to 12 times with each hand. Finger opposition for coordination 1. With one hand, point your fingers and thumb straight up. Your wrist should be relaxed, following the line of your fingers and thumb. 2. Touch your thumb to each finger, one finger at a time. This will look like an \"okay\" sign, but try to keep your other fingers straight and pointing upward as much as you can. 3. Repeat 8 to 12 times with each hand. Finger extension for coordination 1. Place your hand flat on a table. 2. Lift and then lower one finger at a time off the table. 3. Repeat 8 to 12 times with each hand. Shoulder blade squeeze for strength 1. Stand with your arms at your sides, and squeeze your shoulder blades together. Do not raise your shoulders up as you squeeze. 2. Hold 6 seconds. 3. Repeat 8 to 12 times. Bridging for strength 1. Lie on your back with both knees bent. Your knees should be bent about 90 degrees. 2. Then push your feet into the floor, squeeze your buttocks, and lift your hips off the floor until your shoulders, hips, and knees are all in a straight line. 3. Hold for about 6 seconds as you continue to breathe normally. 4. Slowly lower your hips back down to the floor. Rest for up to 10 seconds. 5. Repeat 8 to 12 times. Single-leg balance 1. Stand on a flat surface with your arms stretched out to your sides like you are making the letter \"T. \" Then lift one leg off the floor, bending it at the knee. If you are not steady on your feet, use one hand to hold on to a chair, counter, or wall. 2. Keep your standing knee straight. Try to balance on that leg for up to 30 seconds. Then rest for up to 10 seconds. 3. Repeat 6 to 8 times with each leg. 4. When you can balance on one leg for 30 seconds with your eyes open, try to balance on it with your eyes closed. 5. When you can do this exercise with your eyes closed for 30 seconds and with ease and no pain, try it while standing on a pillow or piece of foam. 
Alternate arm and leg (bird dog) balance Do this exercise slowly. Try to keep your body straight at all times. 1. Start on the floor, on your hands and knees. 2. Tighten your belly muscles by pulling your belly button in toward your spine. Be sure you continue to breathe normally and do not hold your breath. 3. Raise one arm off the floor, and hold it straight out in front of you. Be careful not to let your shoulder drop down, because that will twist your trunk. 4. Hold for about 6 seconds, then lower your arm and switch to your other arm. 5. Repeat 8 to 12 times with each arm. 6. When you can do this exercise with ease and no pain, try it with one leg raised off the floor. Hold your leg straight out behind you. Be careful not to let your hip drop down, because that will twist your trunk. 7. When holding your leg straight out becomes easier, try raising your opposite arm at the same time. Repeat steps 1 through 5. Balance-building exercise 1 1. Stand with a chair in front of you and a wall behind you, in case you lose your balance. 2. Stand with your feet together and your arms at your sides. 3. Move your head up and down 10 times. Balance-building exercise 2 1. Turn your head side to side 10 times. Balance-building exercise 3 1. Move your head diagonally up and down 10 times. Balance-building exercise 4 1. Move your head diagonally up and down 10 times on the other side. Follow-up care is a key part of your treatment and safety. Be sure to make and go to all appointments, and call your doctor if you are having problems. It's also a good idea to know your test results and keep a list of the medicines you take. Where can you learn more? Go to http://nikky-terry.info/. Enter R421 in the search box to learn more about \"Movement Disorders: Exercises. \" Current as of: October 9, 2017 Content Version: 11.7 © 9514-9667 [employer].  Care instructions adapted under license by [address] (which disclaims liability or warranty for this information). If you have questions about a medical condition or this instruction, always ask your healthcare professional. [address] any warranty or liability for your use of this information. Introducing [facility]! Rubadejuan Montoya introduces Revolv patient portal. Now you can access parts of your medical record, email your doctor's office, and request medication refills online. 1. In your internet browser, go to [email or web address] 2. Click on the First Time User? Click Here link in the Sign In box. You will see the New Member Sign Up page. 3. Enter your Revolv Access Code exactly as it appears below. You will not need to use this code after youve completed the sign-up process. If you do not sign up before the expiration date, you must request a new code. · Revolv Access Code: [email or web address] Expires: 11/19/2018  3:52 PM 
 
4. Enter the last four digits of your Social Security Number (xxxx) and Date of Birth (mm/dd/yyyy) as indicated and click Submit. You will be taken to the next sign-up page. 5. Create a Revolv ID. This will be your Revolv login ID and cannot be changed, so think of one that is secure and easy to remember. 6. Create a Revolv password. You can change your password at any time. 7. Enter your Password Reset Question and Answer. This can be used at a later time if you forget your password. 8. Enter your e-mail address. You will receive e-mail notification when new information is available in [address] 9. Click Sign Up. You can now view and download portions of your medical record. 10. Click the Download Summary menu link to download a portable copy of your medical information. If you have questions, please visit the Frequently Asked Questions section of the Revolv website.  Remember, Revolv is NOT to be used for urgent needs. For medical emergencies, dial 911. Now available from your iPhone and Android! Please provide this summary of care documentation to your next provider. If you have any questions after today's visit, please call [phone].

## 2018-08-22 NOTE — PROGRESS NOTES
Deb Lopez    CC: ED follow up    HPI:   HPI complicated by patient being a poor historian.  was present and assisted in interview. Abrasion:  - Patient tripped over a telephone cord on 8/4/2018  - Hit the left side of his head  - Denied any associated LOC, dizziness, light headedness, CP, or SOB  - Went to ED for evaluation  - CT done of head that was negative  - Cardiac evaluation was negative   - Diagnosed with an abrasion  - Abrasion has resolved      Elevated Blood Sugar:  - Blood sugar was elevated in ED blood tests  - Patient initially denied any history of diabetes ( reported patient was not correct)  - Chart review showed patient had previously been on glyburide several years ago  - Patient currently on no medication  - After discussion, patient recalled being diabetic      HTN:  - Patient with elevated BP in ED  - Patient initially denied any history of HTN  - Chart review showed patient had previously been on clonidine and atenolol several years ago  - Patient currently on no medication  - After discussion, patient recalled being on the medications in the past      Tremor:  - Patient concerned about his tremor  - Reports that it has been going on for 6 months  - Would like to start something to help his tremor  - His  believes that it has been going on for longer than that    ROS: Positive items marked in RED  CON: fever, chills  Cardiovascular: palpitations, CP  Resp: SOB, cough  GI: nausea, vomiting, diarrhea  : dysuria, hematuria      Past Medical History:   Diagnosis Date    CKD (chronic kidney disease), stage III     Dementia     Diabetes mellitus, type II ([facility]     HTN (hypertension)        History reviewed. No pertinent surgical history.     Family History   Problem Relation Age of Onset    No Known Problems Mother     No Known Problems Father        Social History     Social History    Marital status: UNKNOWN     Spouse name: N/A    Number of children: N/A    Years of education: N/A     Social History Main Topics    Smoking status: Never Smoker    Smokeless tobacco: Never Used    Alcohol use No    Drug use: None    Sexual activity: Not Asked     Other Topics Concern    None     Social History Narrative    None       No Known Allergies      Current Outpatient Prescriptions:     carbidopa-levodopa (SINEMET)  mg per tablet, Take 1 Tab by mouth three (3) times daily. , Disp: 90 Tab, Rfl: 1    amLODIPine (NORVASC) 5 mg tablet, Take 1 Tab by mouth daily. , Disp: 30 Tab, Rfl: 1    Physical Exam:      /76 (BP 1 Location: Left arm, BP Patient Position: Sitting)  Pulse (!) 59  Temp 96.1 °F (35.6 °C) (Oral)   Resp 14  Ht 5' 8\" (1.727 m)  Wt 137 lb 9.6 oz (62.4 kg)  BMI 20.92 kg/m2    General:  WD, WN, NAD, drooling  Eyes: sclera clear bilaterally, no discharge noted, eyelids normal in appearance  HENT: NCAT, slight hypomimia noted  Lungs: CTAB, normal respiratory effort and rate  CV: RRR, no MRGs  ABD: soft, non-tender, non-distended, normal bowel sounds  Ext: Notable cogwheel rigidity of right arm. no peripheral edema or digital cyanosis noted  Skin: normal temperature, turgor, color, and texture. No abrasion noted on head. Psych: alert, normal affect  Neuro: Stuttering speech, notable difficulty verbally expressing thoughts, shuffling gait, resting tremor noted of right arm/hand (Tremor worsened with agitation, which occurred when patient started having difficulty getting words out),      Head CT (8/4/2018): IMPRESSION:    No evidence of acute intracranial process. EKG (8/4/2018): Normal sinus rhythm   Normal ECG     Results for Jackson Kim (MRN [id number]):   Ref.  Range 8/4/2018 12:48   WBC Latest Ref Range: 4.6 - 13.2 K/uL 3.0 (L)   RBC Latest Ref Range: 4.70 - 5.50 M/uL 4.71   HGB Latest Ref Range: 13.0 - 16.0 g/dL 14.0   HCT Latest Ref Range: 36.0 - 48.0 % 39.8   MCV Latest Ref Range: 74.0 - 97.0 FL 84.5   MCH Latest Ref Range: 24.0 - 34.0 PG 29.7   MCHC Latest Ref Range: 31.0 - 37.0 g/dL 35.2   RDW Latest Ref Range: 11.6 - 14.5 % 12.6   PLATELET Latest Ref Range: 135 - 420 K/uL 104 (L)   MPV Latest Ref Range: 9.2 - 11.8 FL 9.0 (L)   NEUTROPHILS Latest Ref Range: 40 - 73 % 69   LYMPHOCYTES Latest Ref Range: 21 - 52 % 15 (L)   MONOCYTES Latest Ref Range: 3 - 10 % 13 (H)   EOSINOPHILS Latest Ref Range: 0 - 5 % 3   BASOPHILS Latest Ref Range: 0 - 2 % 0   DF Latest Units:   AUTOMATED   ABS. NEUTROPHILS Latest Ref Range: 1.8 - 8.0 K/UL 2.1   ABS. LYMPHOCYTES Latest Ref Range: 0.9 - 3.6 K/UL 0.5 (L)   ABS. MONOCYTES Latest Ref Range: 0.05 - 1.2 K/UL 0.4   ABS. EOSINOPHILS Latest Ref Range: 0.0 - 0.4 K/UL 0.1   ABS. BASOPHILS Latest Ref Range: 0.0 - 0.1 K/UL 0.0   Sodium Latest Ref Range: 136 - 145 mmol/L 140   Potassium Latest Ref Range: 3.5 - 5.5 mmol/L 3.8   Chloride Latest Ref Range: 100 - 108 mmol/L 102   CO2 Latest Ref Range: 21 - 32 mmol/L 32   Anion gap Latest Ref Range: 3.0 - 18 mmol/L 6   Glucose Latest Ref Range: 74 - 99 mg/dL 129 (H)   BUN Latest Ref Range: 7.0 - 18 MG/DL 14   Creatinine Latest Ref Range: 0.6 - 1.3 MG/DL 1.52 (H)   BUN/Creatinine ratio Latest Ref Range: 12 - 20   9 (L)   Calcium Latest Ref Range: 8.5 - 10.1 MG/DL 9.5   GFR est non-AA Latest Ref Range: >60 ml/min/1.73m2 45 (L)   GFR est AA Latest Ref Range: >60 ml/min/1.73m2 54 (L)   Bilirubin, total Latest Ref Range: 0.2 - 1.0 MG/DL 0.6   Bilirubin, direct Latest Ref Range: 0.0 - 0.2 MG/DL 0.2   Protein, total Latest Ref Range: 6.4 - 8.2 g/dL 9.1 (H)   Albumin Latest Ref Range: 3.4 - 5.0 g/dL 4.3   Globulin Latest Ref Range: 2.0 - 4.0 g/dL 4.8 (H)   A-G Ratio Latest Ref Range: 0.8 - 1.7   0.9   ALT (SGPT) Latest Ref Range: 16 - 61 U/L 24   AST Latest Ref Range: 15 - 37 U/L 29   Alk. phosphatase Latest Ref Range: 45 - 117 U/L 71   CK Latest Ref Range: 39 - 308 U/L 101   CK-MB Index Latest Ref Range: 0.0 - 4.0 % CALCULATION NOT P...    CK - MB Latest Ref Range: <3.6 ng/ml <1.0   Troponin-I, Qt. Latest Ref Range: 0.0 - 0.045 NG/ML <0.02       Results for Tirso Howard (MRN <[id number]>)   Ref.  Range 8/21/2018 15:55   Hemoglobin A1c (POC) Latest Units: % 5.6       Assessment/Plan     Parkinson:  - Suspected diagnosis (given presentation) and likely cause of tremor  - Patient counseled on suspected diagnosis  - Will start on trial of Sinemet  - Discussed importance of exercise to minimize parkinson symptoms  - Handout given on Parkinson and exercises for movement disorders  - Follow up in one month      HTN, Inadequately Controlled:  - Will proceed with caution, given patient's fall risk  - Will start on amlodipine regimen  - Discussed importance monitoring for signs of hypotension  - Will check lipid panel (Patient with Hx of being on Simvastatin)  - Follow up in one month      Elevated Blood Sugar:  - HGBA1c is not at diabetic or prediabetic level  - Discussed with patient that his prior diabetes has resolved  - No indication to treat      Abrasion, Resolved:  - Suspect fall occurred 2/2 parkinson  - Will focus on fall prevention          Jay Barreto MD  8/21/2018

## 2018-09-12 ENCOUNTER — OFFICE VISIT (OUTPATIENT)
Dept: FAMILY MEDICINE CLINIC | Age: 78
End: 2018-09-12

## 2018-09-12 VITALS
HEART RATE: 79 BPM | DIASTOLIC BLOOD PRESSURE: 72 MMHG | SYSTOLIC BLOOD PRESSURE: 130 MMHG | BODY MASS INDEX: 20.07 KG/M2 | WEIGHT: 132.4 LBS | HEIGHT: 68 IN | RESPIRATION RATE: 12 BRPM | OXYGEN SATURATION: 95 % | TEMPERATURE: 97 F

## 2018-09-12 DIAGNOSIS — I10 ESSENTIAL HYPERTENSION: ICD-10-CM

## 2018-09-12 DIAGNOSIS — G20 PARKINSON DISEASE (HCC): Primary | ICD-10-CM

## 2018-09-12 NOTE — PROGRESS NOTES
Vitals:    09/12/18 1427 09/12/18 1519 09/12/18 1520 09/12/18 1521   BP: 137/71 154/81 148/78 130/72   BP 1 Location: Left arm      BP Patient Position: Sitting Supine Sitting Standing   Pulse: 79      Resp: 12      Temp: 97 °F (36.1 °C)      TempSrc: Oral      SpO2: 95%      Weight: 132 lb 6.4 oz (60.1 kg)      Height: 5' 8\" (1.727 m)

## 2018-09-12 NOTE — PROGRESS NOTES
Chief Complaint   Patient presents with    Follow Up Chronic Condition     1 month follow up for HTN     1. Have you been to the ER, urgent care clinic since your last visit? Hospitalized since your last visit? No    2. Have you seen or consulted any other health care providers outside of the [address] [place] [address] since your last visit? Include any pap smears or colon screening.  No       Visit Vitals    /71 (BP 1 Location: Left arm, BP Patient Position: Sitting)    Pulse 79    Temp 97 °F (36.1 °C) (Oral)    Resp 12    Ht 5' 8\" (1.727 m)    Wt 132 lb 6.4 oz (60.1 kg)    SpO2 95%    BMI 20.13 kg/m2

## 2018-09-12 NOTE — PROGRESS NOTES
[facility]    CC: F/U of HTN and parkinson's disease    HPI:     HTN:  - Did not get requested fasting blood work (Forgot)  - Does not check BP at home  - Has been taking BP medication as prescribed  - Denies any known side effects or issues with his medication, although notes some recurrent issues with dizziness  - Not following any exercise regimen      Parkinson's Disease:  - Has been taking medication as prescribed  - Denies any known side effects or issues with his medication, although notes some recurrent issues with dizziness  - Thinks there has been some improvement in his tremor  - Not following any exercise regimen      ROS: Positive items marked in RED  CON: fever, chills, falls  Cardiovascular: palpitations, CP  Resp: SOB, cough  GI: nausea, vomiting, diarrhea  : dysuria, hematuria      Past Medical History:   Diagnosis Date    CKD (chronic kidney disease), stage III     Dementia     [facility]     HTN (hypertension)        No past surgical history on file. Family History   Problem Relation Age of Onset    No Known Problems Mother     No Known Problems Father        Social History     Social History    Marital status: UNKNOWN     Spouse name: N/A    Number of children: N/A    Years of education: N/A     Social History Main Topics    Smoking status: Never Smoker    Smokeless tobacco: Never Used    Alcohol use No    Drug use: Not on file    Sexual activity: Not on file     Other Topics Concern    Not on file     Social History Narrative    No narrative on file       No Known Allergies      Current Outpatient Prescriptions:     carbidopa-levodopa (SINEMET)  mg per tablet, Take 1 Tab by mouth three (3) times daily. , Disp: 90 Tab, Rfl: 1    amLODIPine (NORVASC) 5 mg tablet, Take 1 Tab by mouth daily. , Disp: 30 Tab, Rfl: 1    Physical Exam:      /71 (BP 1 Location: Left arm, BP Patient Position: Sitting)  Pulse 79  Temp 97 °F (36.1 °C) (Oral)   Resp 12  Ht 5' 8\" (1.727 m)  Wt 132 lb 6.4 oz (60.1 kg)  SpO2 95%  BMI 20.13 kg/m2    General:  WD, WN, NAD  Eyes: sclera clear bilaterally, no discharge noted, eyelids normal in appearance  HENT: NCAT  Lungs: CTAB, normal respiratory effort and rate  CV: RRR, no MRGs  ABD: soft, non-tender, non-distended, normal bowel sounds  Ext: No peripheral edema or digital cyanosis noted  Skin: normal temperature, turgor, color, and texture. No abrasion noted on head. Psych: alert, normal affect  Neuro: Stuttering speech, notable difficulty verbally expressing thoughts, shuffling gait, resting tremor noted of right arm/hand.       Assessment/Plan     Parkinson Disease:  - Discussed with patient that dizziness maybe due to Parkinson's disease or Parkinson's disease medication  - Counseled on importance or practicing fall prevention techniques  - Will continue current Sinemet regimen  - Follow up in one month      HTN, Improving:  - Will proceed with caution, given patient's fall risk  - Orthostatic testing in office was negative  - Will continue current amlodipine regimen  - Discussed importance monitoring for signs of hypotension and checking BP at home  - Advised to get previously discussed fasting panel  - Follow up in one month          Alyssa Telles MD  9/12/2018, 2:26 PM

## 2018-09-12 NOTE — PATIENT INSTRUCTIONS
Cholesterol and Triglycerides Tests: About These Tests  What are they? Cholesterol and triglycerides tests measure the amount of fats in your blood. These fats have both \"good\" (HDL) and \"bad\" (LDL) cholesterol. Why are these tests done? These tests are done to help find out your risk of a heart attack and stroke. They can help your doctor find out how well medicine is working for some health problems. How can you prepare for these tests? · Your doctor may tell you to fast before your tests. This means that you do not eat or drink anything except water for 9 to 12 hours before the tests. In most cases, you can take your medicines with water the morning of the test.  · Do not eat high-fat foods the night before the tests. · Do not drink alcohol or do intense exercise the night before the tests. · Be sure to tell your doctor about all the over-the-counter and prescription medicines and herbs or other supplements you take. They can affect the results of these tests. What happens during these tests? A health professional takes a sample of your blood. What else should you know about these tests? Your cholesterol levels can help your doctor find out your risk for having a heart attack or stroke. But it's not just about your cholesterol. Your doctor uses your cholesterol levels plus other things to calculate your risk. These include:  · Your blood pressure. · Whether or not you have diabetes. · Your age, sex, and race. · Whether or not you smoke. You and your doctor can talk about whether you need to lower your risk and what treatment is best for you. Where can you learn more? Go to http://nikky-terry.info/. Enter F365 in the search box to learn more about \"Cholesterol and Triglycerides Tests: About These Tests. \"  Current as of: May 10, 2017  Content Version: 11.7  © 0784-9141 [employer].  Care instructions adapted under license by [employer] (which disclaims liability or warranty for this information). If you have questions about a medical condition or this instruction, always ask your healthcare professional. [address] any warranty or liability for your use of this information.

## 2018-09-12 NOTE — MR AVS SNAPSHOT
Nikki Olivo [facility] [address] [facility] [address] [facility] [address] [id number] 
[phone] Patient: Maldonado Oliva MRN: [id number] CQA:[birth year] Visit Information Date & Time Provider Department Dept. Phone Encounter #  
 9/12/2018  2:45 PM [facility], [address] [phone] [id number] Follow-up Instructions Return in about 1 month (around 10/12/2018) for Needs appointment for fasting blood work. Upcoming Health Maintenance Date Due DTaP/Tdap/Td series (1 - Tdap) 4/30/1961 ZOSTER VACCINE AGE 60> 2/29/2000 GLAUCOMA SCREENING Q2Y 4/30/2005 Bone Densitometry (Dexa) Screening 4/30/2005 Pneumococcal 65+ Low/Medium Risk (1 of 2 - PCV13) 4/30/2005 Influenza Age 5 to Adult 8/1/2018 MEDICARE YEARLY EXAM 8/21/2018 Allergies as of 9/12/2018  Review Complete On: 9/12/2018 By: Nickie Ferrera LPN No Known Allergies Current Immunizations  Never Reviewed No immunizations on file. Not reviewed this visit You Were Diagnosed With   
  
 Codes Comments [facility] disease ([facility]    -  Primary ICD-10-CM: G20 
ICD-9-CM: 332.0 Essential hypertension     ICD-10-CM: I10 
ICD-9-CM: 401.9 Vitals BP Pulse Temp Resp Height(growth percentile) Weight(growth percentile) 130/72 (BP Patient Position: Standing) 79 97 °F (36.1 °C) (Oral) 12 5' 8\" (1.727 m) 132 lb 6.4 oz (60.1 kg) SpO2 BMI Smoking Status 95% 20.13 kg/m2 Never Smoker Vitals History BMI and BSA Data Body Mass Index Body Surface Area  
 20.13 kg/m 2 1.7 m 2 Preferred Pharmacy Pharmacy Name Phone [place], [address] [facility] [address] [facility] [phone] Your Updated Medication List  
  
   
This list is accurate as of 9/12/18  3:45 PM.  Always use your most recent med list. amLODIPine 5 mg tablet Commonly known as:  Elena Gunderson Take 1 Tab by mouth daily. carbidopa-levodopa  mg per tablet Commonly known as:  SINEMET Take 1 Tab by mouth three (3) times daily. Follow-up Instructions Return in about 1 month (around 10/12/2018) for Needs appointment for fasting blood work. Patient Instructions Cholesterol and Triglycerides Tests: About These Tests What are they? Cholesterol and triglycerides tests measure the amount of fats in your blood. These fats have both \"good\" (HDL) and \"bad\" (LDL) cholesterol. Why are these tests done? These tests are done to help find out your risk of a heart attack and stroke. They can help your doctor find out how well medicine is working for some health problems. How can you prepare for these tests? · Your doctor may tell you to fast before your tests. This means that you do not eat or drink anything except water for 9 to 12 hours before the tests. In most cases, you can take your medicines with water the morning of the test. 
· Do not eat high-fat foods the night before the tests. · Do not drink alcohol or do intense exercise the night before the tests. · Be sure to tell your doctor about all the over-the-counter and prescription medicines and herbs or other supplements you take. They can affect the results of these tests. What happens during these tests? A health professional takes a sample of your blood. What else should you know about these tests? Your cholesterol levels can help your doctor find out your risk for having a heart attack or stroke. But it's not just about your cholesterol. Your doctor uses your cholesterol levels plus other things to calculate your risk. These include: 
· Your blood pressure. · Whether or not you have diabetes. · Your age, sex, and race. · Whether or not you smoke. You and your doctor can talk about whether you need to lower your risk and what treatment is best for you. Where can you learn more? Go to http://nikky-terry.info/. Enter Y886 in the search box to learn more about \"Cholesterol and Triglycerides Tests: About These Tests. \" Current as of: May 10, 2017 Content Version: 11.7 © 4354-5923 [employer]. Care instructions adapted under license by [employer] (which disclaims liability or warranty for this information). If you have questions about a medical condition or this instruction, always ask your healthcare professional. [address] any warranty or liability for your use of this information. Introducing [facility]! [facility] introduces Treatspace patient portal. Now you can access parts of your medical record, email your doctor's office, and request medication refills online. 1. In your internet browser, go to [email or web address] 2. Click on the First Time User? Click Here link in the Sign In box. You will see the New Member Sign Up page. 3. Enter your Treatspace Access Code exactly as it appears below. You will not need to use this code after youve completed the sign-up process. If you do not sign up before the expiration date, you must request a new code. · Treatspace Access Code: [email or web address] Expires: 11/19/2018  3:52 PM 
 
4. Enter the last four digits of your Social Security Number (xxxx) and Date of Birth (mm/dd/yyyy) as indicated and click Submit. You will be taken to the next sign-up page. 5. Create a Treatspace ID. This will be your Treatspace login ID and cannot be changed, so think of one that is secure and easy to remember. 6. Create a Treatspace password. You can change your password at any time. 7. Enter your Password Reset Question and Answer. This can be used at a later time if you forget your password. 8. Enter your e-mail address. You will receive e-mail notification when new information is available in [address] 9. Click Sign Up. You can now view and download portions of your medical record. 10. Click the Download Summary menu link to download a portable copy of your medical information. If you have questions, please visit the Frequently Asked Questions section of the ChupaMobile website. Remember, ChupaMobile is NOT to be used for urgent needs. For medical emergencies, dial 911. Now available from your iPhone and Android! Please provide this summary of care documentation to your next provider. Your primary care clinician is listed as Savanah Cabrera. If you have any questions after today's visit, please call [phone].

## 2018-10-09 ENCOUNTER — HOSPITAL ENCOUNTER (OUTPATIENT)
Dept: LAB | Age: 78
Discharge: HOME | End: 2018-10-09
Payer: MEDICARE

## 2018-10-09 DIAGNOSIS — I10 ESSENTIAL HYPERTENSION: ICD-10-CM

## 2018-10-09 LAB
CHOLEST SERPL-MCNC: 161 MG/DL
HDLC SERPL-MCNC: 67 MG/DL (ref 40–60)
HDLC SERPL: 2.4 {RATIO} (ref 0–5)
LDLC SERPL CALC-MCNC: 85.4 MG/DL (ref 0–100)
LIPID PROFILE,FLP: ABNORMAL
TRIGL SERPL-MCNC: 43 MG/DL (ref ?–150)
VLDLC SERPL CALC-MCNC: 8.6 MG/DL

## 2018-10-09 PROCEDURE — 36415 COLL VENOUS BLD VENIPUNCTURE: CPT | Performed by: FAMILY MEDICINE

## 2018-10-09 PROCEDURE — 80061 LIPID PANEL: CPT | Performed by: FAMILY MEDICINE

## 2018-10-15 ENCOUNTER — OFFICE VISIT (OUTPATIENT)
Dept: FAMILY MEDICINE CLINIC | Age: 78
End: 2018-10-15

## 2018-10-15 ENCOUNTER — HOSPITAL ENCOUNTER (OUTPATIENT)
Dept: LAB | Age: 78
Discharge: HOME | End: 2018-10-15
Payer: MEDICARE

## 2018-10-15 VITALS
DIASTOLIC BLOOD PRESSURE: 70 MMHG | HEIGHT: 68 IN | SYSTOLIC BLOOD PRESSURE: 133 MMHG | TEMPERATURE: 96.4 F | HEART RATE: 85 BPM | RESPIRATION RATE: 14 BRPM | WEIGHT: 128.6 LBS | BODY MASS INDEX: 19.49 KG/M2

## 2018-10-15 DIAGNOSIS — G20 PARKINSON'S DISEASE (TREMOR, STIFFNESS, SLOW MOTION, UNSTABLE POSTURE) (HCC): ICD-10-CM

## 2018-10-15 DIAGNOSIS — I10 ESSENTIAL HYPERTENSION: Primary | ICD-10-CM

## 2018-10-15 DIAGNOSIS — I10 ESSENTIAL HYPERTENSION: ICD-10-CM

## 2018-10-15 PROCEDURE — 36415 COLL VENOUS BLD VENIPUNCTURE: CPT | Performed by: FAMILY MEDICINE

## 2018-10-15 PROCEDURE — 83695 ASSAY OF LIPOPROTEIN(A): CPT | Performed by: FAMILY MEDICINE

## 2018-10-15 RX ORDER — AMLODIPINE BESYLATE 5 MG/1
5 TABLET ORAL DAILY
Qty: 90 TAB | Refills: 2 | Status: SHIPPED | OUTPATIENT
Start: 2018-10-15 | End: 2019-04-10 | Stop reason: SDUPTHER

## 2018-10-15 NOTE — PROGRESS NOTES
[facility]    CC: F/U of HTN and Parkinson Disease    HPI:     HTN:  - Got requested fasting blood work  - Does not check BP at home  - Has been taking BP medication as prescribed  - Denies any known side effects or issues with his medication, although notes some recurrent issues with dizziness  - Denies any falls, but has occasional feelings of instability  - Not following any exercise regimen        Parkinson's Disease:  - Has been taking medication as prescribed  - Denies any known side effects or issues with his medication, although notes some recurrent issues with dizziness  - Denies any falls, but has occasional feelings of instability  - Reports improvement in his tremor with the medication and prefers to remain on medication even though it maybe contributing to his dizzy spells      ROS: Positive items marked in RED  CON: fever, chills  Cardiovascular: palpitations, CP  Resp: SOB, cough  GI: nausea, vomiting, diarrhea  : dysuria, hematuria      Past Medical History:   Diagnosis Date    CKD (chronic [facility] disease), [facility] III ([facility]     Dementia     Diabetes ([facility]     Diabetes mellitus, type II ([facility]     HTN (hypertension)     Hypertension     Kidney problem        History reviewed. No pertinent surgical history.     Family History   Problem Relation Age of Onset    No Known Problems Mother     No Known Problems Father     Diabetes Other     Hypertension Other        Social History     Social History    Marital status: UNKNOWN     Spouse name: N/A    Number of children: N/A    Years of education: N/A     Social History Main Topics    Smoking status: Never Smoker    Smokeless tobacco: Never Used    Alcohol use No    Drug use: None    Sexual activity: Not Asked     Other Topics Concern    None     Social History Narrative    ** Merged History Encounter **            No Known Allergies      Current Outpatient Prescriptions:     carbidopa-levodopa (SINEMET)  mg per tablet, Take 1 Tab by mouth three (3) times daily. , Disp: 90 Tab, Rfl: 1    amLODIPine (NORVASC) 5 mg tablet, Take 1 Tab by mouth daily. , Disp: 30 Tab, Rfl: 1    Physical Exam:      /70 (BP 1 Location: Left arm, BP Patient Position: Sitting)  Pulse 85  Temp 96.4 °F (35.8 °C) (Oral)   Resp 14  Ht 5' 8\" (1.727 m)  Wt 128 lb 9.6 oz (58.3 kg)  BMI 19.55 kg/m2    General:  WD, WN, NAD  Eyes: sclera clear bilaterally, no discharge noted, eyelids normal in appearance  HENT: NCAT  Lungs: CTAB, normal respiratory effort and rate  CV: RRR, no MRGs  ABD: soft, non-tender, non-distended, normal bowel sounds  Ext: No peripheral edema or digital cyanosis noted  Skin: normal temperature, turgor, color, and texture. Psych: alert, normal affect  Neuro: Stuttering speech, notable difficulty verbally expressing thoughts, shuffling gait, resting tremor noted of right arm/hand. Results for Bobby Patel (MRN [id number]):   Ref. Range 10/9/2018 10:23   Triglyceride Latest Ref Range: <150 MG/DL 43   Cholesterol, total Latest Ref Range: <200 MG/   HDL Cholesterol Latest Ref Range: 40 - 60 MG/DL 67 (H)   CHOL/HDL Ratio Latest Ref Range: 0 - 5.0   2.4   LDL, calculated Latest Ref Range: 0 - 100 MG/DL 85.4   VLDL, calculated Latest Units: MG/DL 8.6         Assessment/Plan       HTN:  - 10 year ASCVD risk of 22.4%  - Unable to use target BP goal of <130/80 due high fall risk (Patient with likely orthostatic hypotension 2/2 age and Parkinson disease)  - Discussed difficulties of improving ASCVD risk with BP management alone.   - After discussion with patient it was agreed that if lipoprotein(a) is elevated, that he would be interested in starting statin therapy for risk reduction  - Lipoprotein(a) level ordered  - Will continue current BP regimen  - F/U in one month for medicare wellness visit      Parkinson Disease, Stable:  - Will continue current regimen  - Patient advised to contact office if he has any falls  - F/U in one month for medicare wellness visit      Marina Arndt MD  10/15/2018, 2:37 PM

## 2018-10-15 NOTE — MR AVS SNAPSHOT
Nikki Iyer [place] [address] [facility] [address] [facility] 83 [address] 
[phone] Patient: Aisha Ray MRN: [id number] [id number] Visit Information Date & Time Provider Department Dept. Phone Encounter #  
 10/15/2018  2:00 PM Presley Ham, 1500 [facility] [phone] [id number] Follow-up Instructions Return in about 1 month (around 11/15/2018) for medicare well visit. Upcoming Health Maintenance Date Due Shingrix Vaccine Age 50> (1 of 2) 1990 GLAUCOMA SCREENING Q2Y 2005 Pneumococcal 65+ Low/Medium Risk (1 of 2 - PCV13) 2005 Influenza Age 5 to Adult 2018 MEDICARE YEARLY EXAM 2018 DTaP/Tdap/Td series (2 - Td) 2028 Allergies as of 10/15/2018  Review Complete On: 10/15/2018 By: Keith Chavez LPN No Known Allergies Current Immunizations  Never Reviewed Name Date Tdap 2018  2:02 PM  
  
 Not reviewed this visit You Were Diagnosed With   
  
 Codes Comments Essential hypertension    -  Primary ICD-10-CM: I10 
ICD-9-CM: 401.9 Parkinson's disease (tremor, stiffness, slow motion, unstable posture) ([facility].)     ICD-10-CM: G20 
ICD-9-CM: 332.0 Vitals BP Pulse Temp Resp Height(growth percentile) Weight(growth percentile) 133/70 (BP 1 Location: Left arm, BP Patient Position: Sitting) 85 96.4 °F (35.8 °C) (Oral) 14 5' 8\" (1.727 m) 128 lb 9.6 oz (58.3 kg) BMI Smoking Status 19.55 kg/m2 Never Smoker Vitals History BMI and BSA Data Body Mass Index Body Surface Area  
 19.55 kg/m 2 1.67 m 2 Preferred Pharmacy Pharmacy Name Phone [place], [address] [phone] Your Updated Medication List  
  
   
This list is accurate as of 10/15/18  2:39 PM.  Always use your most recent med list. amLODIPine 5 mg tablet Commonly known as:  Panchito Kincaid Take 1 Tab by mouth daily. carbidopa-levodopa  mg per tablet Commonly known as:  SINEMET Take 1 Tab by mouth three (3) times daily. Prescriptions Sent to Pharmacy Refills  
 amLODIPine (NORVASC) 5 mg tablet 2 Sig: Take 1 Tab by mouth daily. Class: Normal  
 Pharmacy: [facility] [address] [place] [address], [address] [place] [address] #: [phone] Route: Oral  
  
Follow-up Instructions Return in about 1 month (around 11/15/2018) for medicare well visit. To-Do List   
 10/15/2018 Lab:  LIPOPROTEIN (A) Introducing [facility]! Dear Pamela Lipscomb: 
Thank you for requesting a [email or web address] account. Our records indicate that you already have an active [email or web address] account. You can access your account anytime at [email or web address] Did you know that you can access your hospital and ER discharge instructions at any time in [email or web address]? You can also review all of your test results from your hospital stay or ER visit. Additional Information If you have questions, please visit the Frequently Asked Questions section of the [email or web address] website at [email or web address] Remember, [email or web address] is NOT to be used for urgent needs. For medical emergencies, dial 911. Now available from your iPhone and Android! Please provide this summary of care documentation to your next provider. Your primary care clinician is listed as Delta Santos. If you have any questions after today's visit, please call [phone].

## 2018-10-15 NOTE — PROGRESS NOTES
Chief Complaint   Patient presents with    Follow Up Chronic Condition     HTN & Parkinson Disease     1. Have you been to the ER, urgent care clinic since your last visit? Hospitalized since your last visit? No    2. Have you seen or consulted any other health care providers outside of the [address] [place] [address] since your last visit? Include any pap smears or colon screening.  No     Visit Vitals    /70 (BP 1 Location: Left arm, BP Patient Position: Sitting)    Pulse 85    Temp 96.4 °F (35.8 °C) (Oral)    Resp 14    Ht 5' 8\" (1.727 m)    Wt 128 lb 9.6 oz (58.3 kg)    BMI 19.55 kg/m2

## 2018-10-16 LAB — LPA SERPL-SCNC: 213 NMOL/L

## 2018-10-18 PROBLEM — I10 HTN (HYPERTENSION): Status: ACTIVE | Noted: 2018-10-18

## 2018-10-18 PROBLEM — G20 PARKINSON DISEASE (HCC): Status: ACTIVE | Noted: 2018-10-18

## 2018-10-18 PROBLEM — E78.41 ELEVATED LIPOPROTEIN(A): Status: ACTIVE | Noted: 2018-10-18

## 2018-12-05 ENCOUNTER — OFFICE VISIT (OUTPATIENT)
Dept: FAMILY MEDICINE CLINIC | Age: 78
End: 2018-12-05

## 2018-12-05 VITALS
HEIGHT: 68 IN | RESPIRATION RATE: 16 BRPM | WEIGHT: 124.8 LBS | SYSTOLIC BLOOD PRESSURE: 134 MMHG | HEART RATE: 81 BPM | DIASTOLIC BLOOD PRESSURE: 76 MMHG | TEMPERATURE: 97.3 F | BODY MASS INDEX: 18.91 KG/M2

## 2018-12-05 DIAGNOSIS — E78.41 ELEVATED LIPOPROTEIN(A): ICD-10-CM

## 2018-12-05 DIAGNOSIS — Z00.00 INITIAL MEDICARE ANNUAL WELLNESS VISIT: Primary | ICD-10-CM

## 2018-12-05 DIAGNOSIS — Z13.5 GLAUCOMA SCREENING: ICD-10-CM

## 2018-12-05 DIAGNOSIS — Z23 ENCOUNTER FOR IMMUNIZATION: ICD-10-CM

## 2018-12-05 RX ORDER — ATORVASTATIN CALCIUM 40 MG/1
40 TABLET, FILM COATED ORAL DAILY
Qty: 90 TAB | Refills: 1 | Status: SHIPPED | OUTPATIENT
Start: 2018-12-05 | End: 2019-04-10 | Stop reason: DRUGHIGH

## 2018-12-05 NOTE — PROGRESS NOTES
1. Have you been to the ER, urgent care clinic since your last visit? Hospitalized since your last visit? No 
 
2. Have you seen or consulted any other health care providers outside of the [address] [place] [address] since your last visit? Include any pap smears or colon screening. No 
 
Visit Vitals /76 Pulse 81 Temp 97.3 °F (36.3 °C) (Oral) Resp 16 Ht 5' 8\" (1.727 m) Wt 124 lb 12.8 oz (56.6 kg) BMI 18.98 kg/m²  
 
-Hard time to ready O2. 
-Patient brought all his V.A record vaccines

## 2018-12-05 NOTE — PATIENT INSTRUCTIONS
Medicare Wellness Visit, Male The best way to live healthy is to have a lifestyle where you eat a well-balanced diet, exercise regularly, limit alcohol use, and quit all forms of tobacco/nicotine, if applicable. Regular preventive services are another way to keep healthy. Preventive services (vaccines, screening tests, monitoring & exams) can help personalize your care plan, which helps you manage your own care. Screening tests can find health problems at the earliest stages, when they are easiest to treat. [facility] follows the current, evidence-based guidelines published by the [facility] ([facility]) when recommending preventive services for our patients. Because we follow these guidelines, sometimes recommendations change over time as research supports it. (For example, a prostate screening blood test is no longer routinely recommended for men with no symptoms.) Of course, you and your doctor may decide to screen more often for some diseases, based on your risk and co-morbidities (chronic disease you are already diagnosed with). Preventive services for you include: - Medicare offers their members a free annual wellness visit, which is time for you and your primary care provider to discuss and plan for your preventive service needs. Take advantage of this benefit every year! 
-All adults over age 72 should receive the recommended pneumonia vaccines. Current USPSTF guidelines recommend a series of two vaccines for the best pneumonia protection.  
-All adults should have a flu vaccine yearly and an ECG.  All adults age 61 and older should receive a shingles vaccine once in their lifetime.   
-All adults age 38-68 who are overweight should have a diabetes screening test once every three years.  
-Other screening tests & preventive services for persons with diabetes include: an eye exam to screen for diabetic retinopathy, a kidney function test, a foot exam, and stricter control over your cholesterol.  
-Cardiovascular screening for adults with routine risk involves an electrocardiogram (ECG) at intervals determined by the provider.  
-Colorectal cancer screening should be done for adults age 54-65 with no increased risk factors for colorectal cancer. There are a number of acceptable methods of screening for this type of cancer. Each test has its own benefits and drawbacks. Discuss with your provider what is most appropriate for you during your annual wellness visit. The different tests include: colonoscopy (considered the best screening method), a fecal occult blood test, a fecal DNA test, and sigmoidoscopy. 
-All adults born between [facility] should be screened once for Hepatitis C. 
-An Abdominal Aortic Aneurysm (AAA) Screening is recommended for men age 73-68 who has ever smoked in their lifetime. Here is a list of your current Health Maintenance items (your personalized list of preventive services) with a due date: 
Health Maintenance Due Topic Date Due  Shingles Vaccine (1 of 2) 04/30/1990  Glaucoma Screening   04/30/2005  Pneumococcal Vaccine (1 of 2 - PCV13) 04/30/2005  Flu Vaccine  08/01/2018 [facility] Annual Well Visit  08/21/2018 Advance Directives: Care Instructions Your Care Instructions An advance directive is a legal way to state your wishes at the end of your life. It tells your family and your doctor what to do if you can no longer say what you want. There are two main types of advance directives. You can change them any time that your wishes change. · A living will tells your family and your doctor your wishes about life support and other treatment. · A durable power of  for health care lets you name a person to make treatment decisions for you when you can't speak for yourself. This person is called a health care agent.  
If you do not have an advance directive, decisions about your medical care may be made by a doctor or a  who doesn't know you. It may help to think of an advance directive as a gift to the people who care for you. If you have one, they won't have to make tough decisions by themselves. Follow-up care is a key part of your treatment and safety. Be sure to make and go to all appointments, and call your doctor if you are having problems. It's also a good idea to know your test results and keep a list of the medicines you take. How can you care for yourself at home? · Discuss your wishes with your loved ones and your doctor. This way, there are no surprises. · Many states have a unique form. Or you might use a universal form that has been approved by many states. This kind of form can sometimes be completed and stored online. Your electronic copy will then be available wherever you have a connection to the Internet. In most cases, doctors will respect your wishes even if you have a form from a different state. · You don't need a  to do an advance directive. But you may want to get legal advice. · Think about these questions when you prepare an advance directive: 
? Who do you want to make decisions about your medical care if you are not able to? Many people choose a family member or close friend. ? Do you know enough about life support methods that might be used? If not, talk to your doctor so you understand. ? What are you most afraid of that might happen? You might be afraid of having pain, losing your independence, or being kept alive by machines. ? Where would you prefer to die? Choices include your home, a hospital, or a nursing home. ? Would you like to have information about hospice care to support you and your family? ? Do you want to donate organs when you die? ? Do you want certain Gnosticist practices performed before you die? If so, put your wishes in the advance directive. · Read your advance directive every year, and make changes as needed. When should you call for help? Be sure to contact your doctor if you have any questions. Where can you learn more? Go to http://nikky-terry.info/. Enter R264 in the search box to learn more about \"Advance Directives: Care Instructions. \" Current as of: April 19, 2018 Content Version: 11.8 © 1584-6332 Healthwise, [employer]. Care instructions adapted under license by [employer] (which disclaims liability or warranty for this information). If you have questions about a medical condition or this instruction, always ask your healthcare professional. [address] any warranty or liability for your use of this information.

## 2018-12-05 NOTE — PROGRESS NOTES
This is an Initial Medicare Annual Wellness Exam (AWV) (Performed 12 months after IPPE or effective date of Medicare Part B enrollment, Once in a lifetime) I have reviewed the patient's medical history in detail and updated the computerized patient record. History Past Medical History:  
Diagnosis Date  CKD (chronic kidney disease), stage III ([facility]  Dementia  Diabetes ([facility]  Diabetes mellitus, type II ([facility]  HTN (hypertension)  Hypertension  Kidney problem No past surgical history on file. Current Outpatient Medications Medication Sig Dispense Refill  carbidopa-levodopa (SINEMET)  mg per tablet Take 1 Tab by mouth three (3) times daily. 180 Tab 6  
 amLODIPine (NORVASC) 5 mg tablet Take 1 Tab by mouth daily. 90 Tab 2 No Known Allergies Family History Problem Relation Age of Onset  No Known Problems Mother  No Known Problems Father  Diabetes Other  Hypertension Other Social History Tobacco Use  Smoking status: Never Smoker  Smokeless tobacco: Never Used Substance Use Topics  Alcohol use: No  
 
Patient Active Problem List  
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  
 HTN (hypertension) I10  
 Parkinson disease ([facility] G20 Depression Risk Factor Screening: PHQ over the last two weeks 12/5/2018 Little interest or pleasure in doing things Not at all Feeling down, depressed, irritable, or hopeless Not at all Total Score PHQ 2 0 Alcohol Risk Factor Screening: You do not drink alcohol or very rarely. Functional Ability and Level of Safety:  
 
Hearing Loss Hearing is good. Whisper test normal. 
 
Activities of Daily Living The home contains: handrails and grab bars Patient needs help with:  transportation Fall Risk Fall Risk Assessment, last 12 mths 8/21/2018 Able to walk? Yes Fall in past 12 months? Yes Fall with injury?  Yes  
Number of falls in past 12 months 1  
 Fall Risk Score 2 Abuse Screen Patient is not abused Cognitive Screening Evaluation of Cognitive Function: 
Has your family/caregiver stated any concerns about your memory: yes Abnormal. Does not know day Patient Care Team  
Patient Care Team: 
Dairen Navarro MD as PCP - [facility]) Other, MD Aureliano 
 
Assessment/Plan Education and counseling provided: 
Are appropriate based on today's review and evaluation End-of-Life planning (with patient's consent) Pneumococcal Vaccine Influenza Vaccine Screening for glaucoma Diagnoses and all orders for this visit: 
 
1. Initial Medicare annual wellness visit 
-     FULL CODE 
 
2. Encounter for immunization -     ADMIN INFLUENZA VIRUS VAC 
-     INFLUENZA VACCINE INACTIVATED (IIV), SUBUNIT, ADJUVANTED, IM 
-     ADMIN PNEUMOCOCCAL VACCINE 
-     PNEUMOCOCCAL CONJ VACCINE 13 VALENT IM 
 
3. Glaucoma screening 
-     REFERRAL TO OPHTHALMOLOGY 4. Elevated lipoprotein(a) 
-     atorvastatin (LIPITOR) 40 mg tablet; Take 1 Tab by mouth daily. Health Maintenance Due Topic Date Due  Shingrix Vaccine Age 50> (1 of 2) 04/30/1990  GLAUCOMA SCREENING Q2Y  04/30/2005  Pneumococcal 65+ Low/Medium Risk (1 of 2 - PCV13) 04/30/2005  Influenza Age 5 to Adult  08/01/2018  MEDICARE YEARLY EXAM  08/21/2018

## 2018-12-06 NOTE — ACP (ADVANCE CARE PLANNING)
Advance Care Planning (ACP) Provider Note - Comprehensive     Date of ACP Conversation: 12/05/18  Persons included in Conversation:  Patient and friend  Length of ACP Conversation in minutes:  <16 minutes (Non-Billable)    Authorized Decision Maker (if patient is incapable of making informed decisions): This person is:  Would like his friend, Montse Parham, to be authorized decision maker if he becomes incapable of making informed decisions     General ACP for ALL Patients with Decision Making Capacity:   Importance of advance care planning, including choosing a healthcare agent to communicate patient's healthcare decisions if patient lost the ability to make decisions, such as after a sudden illness or accident    Review of Existing Advance Directive:  N/A    For Serious or Chronic Illness:  Understanding of CPR, goals and expected outcomes, benefits and burdens discussed.     Interventions Provided:  Recommended completion of Advance Directive form after review of ACP materials and conversation with prospective healthcare agent

## 2019-01-08 ENCOUNTER — OFFICE VISIT (OUTPATIENT)
Dept: FAMILY MEDICINE CLINIC | Age: 79
End: 2019-01-08

## 2019-01-08 VITALS
RESPIRATION RATE: 20 BRPM | HEART RATE: 82 BPM | BODY MASS INDEX: 18.94 KG/M2 | OXYGEN SATURATION: 100 % | SYSTOLIC BLOOD PRESSURE: 121 MMHG | DIASTOLIC BLOOD PRESSURE: 65 MMHG | WEIGHT: 125 LBS | TEMPERATURE: 95.9 F | HEIGHT: 68 IN

## 2019-01-08 DIAGNOSIS — E78.41 ELEVATED LIPOPROTEIN(A): Primary | ICD-10-CM

## 2019-01-08 NOTE — PROGRESS NOTES
1. Have you been to the ER, urgent care clinic since your last visit? Hospitalized since your last visit? No    2. Have you seen or consulted any other health care providers outside of the [address] [place] [address] since your last visit? Include any pap smears or colon screening.  No     Chief Complaint   Patient presents with    Follow-up     Visit Vitals  /65   Pulse 82   Temp 95.9 °F (35.5 °C)   Resp 20   Ht 5' 8\" (1.727 m)   Wt 125 lb (56.7 kg)   SpO2 100%   BMI 19.01 kg/m²

## 2019-01-08 NOTE — PROGRESS NOTES
Richard Lopez    CC: Follow-up of Elevated Lipoprotein (a)    HPI:     Elevated Lipoprotein (a):  -Has been taking statin to reduce ASCVD risk, as discussed at last visit  -Denies any known side effects or issues with the medication  -Light exercising every other day for 30 min      ROS: Positive items marked in RED  CON: fever, chills  Cardiovascular: palpitations, CP  Resp: SOB, cough  GI: nausea, vomiting, diarrhea  : dysuria, hematuria      Past Medical History:   Diagnosis Date    [facility]     Dementia     Diabetes mellitus, type II ([facility]     HTN (hypertension)     Kidney problem     Parkinson's disease ([facility]        No past surgical history on file. Family History   Problem Relation Age of Onset    No Known Problems Mother     No Known Problems Father     Diabetes Other     Hypertension Other        Social History     Socioeconomic History    Marital status: UNKNOWN     Spouse name: Not on file    Number of children: Not on file    Years of education: Not on file    Highest education level: Not on file   Tobacco Use    Smoking status: Never Smoker    Smokeless tobacco: Never Used   Substance and Sexual Activity    Alcohol use: No   Social History Narrative    ** Merged History Encounter **            No Known Allergies      Current Outpatient Medications:     carbidopa-levodopa (SINEMET)  mg per tablet, Take 1 Tab by mouth three (3) times daily. , Disp: 180 Tab, Rfl: 6    amLODIPine (NORVASC) 5 mg tablet, Take 1 Tab by mouth daily. , Disp: 90 Tab, Rfl: 2    atorvastatin (LIPITOR) 40 mg tablet, Take 1 Tab by mouth daily. , Disp: 90 Tab, Rfl: 1    Physical Exam:      /65   Pulse 82   Temp 95.9 °F (35.5 °C)   Resp 20   Ht 5' 8\" (1.727 m)   Wt 125 lb (56.7 kg)   SpO2 100%   BMI 19.01 kg/m²     General:  WD, WN, NAD  Eyes: sclera clear bilaterally, no discharge noted, eyelids normal in appearance  HENT: NCAT  Lungs: CTAB, normal respiratory effort and rate  CV: RRR, no MRGs  ABD: soft, non-tender, non-distended, normal bowel sounds  Ext: No peripheral edema or digital cyanosis noted  Skin: normal temperature, turgor, color, and texture. Psych: alert, normal affect  Neuro: Stuttering speech, notable difficulty verbally expressing thoughts, shuffling gait, resting tremor noted of right arm/hand.       Assessment/Plan     Elevated Lipoprotein (a):  -10 year ASCVD risk of 22.4%  -Discussed with patient that he would likely not qualify for PCSK-9 inhibitor, due to not having any known CV disease and not being on highest dose of statin therapy  -Will continue current statin therapy  -Follow up in 3 months for chronic disease management        Edward Echavarria MD  1/8/2019, 11:34 AM

## 2019-04-10 ENCOUNTER — OFFICE VISIT (OUTPATIENT)
Dept: FAMILY MEDICINE CLINIC | Age: 79
End: 2019-04-10

## 2019-04-10 VITALS
WEIGHT: 124 LBS | HEIGHT: 68 IN | HEART RATE: 81 BPM | TEMPERATURE: 96.9 F | RESPIRATION RATE: 12 BRPM | SYSTOLIC BLOOD PRESSURE: 122 MMHG | DIASTOLIC BLOOD PRESSURE: 74 MMHG | BODY MASS INDEX: 18.79 KG/M2

## 2019-04-10 DIAGNOSIS — E78.41 ELEVATED LIPOPROTEIN(A): Primary | ICD-10-CM

## 2019-04-10 DIAGNOSIS — I10 ESSENTIAL HYPERTENSION: ICD-10-CM

## 2019-04-10 DIAGNOSIS — G20 PARKINSON'S SYNDROME (HCC): ICD-10-CM

## 2019-04-10 RX ORDER — AMLODIPINE BESYLATE 5 MG/1
5 TABLET ORAL DAILY
Qty: 90 TAB | Refills: 2 | Status: SHIPPED | OUTPATIENT
Start: 2019-04-10 | End: 2019-11-19 | Stop reason: SDUPTHER

## 2019-04-10 RX ORDER — CARBIDOPA AND LEVODOPA 25; 100 MG/1; MG/1
1 TABLET ORAL 3 TIMES DAILY
Qty: 180 TAB | Refills: 6 | Status: SHIPPED | OUTPATIENT
Start: 2019-04-10 | End: 2019-11-19 | Stop reason: SDUPTHER

## 2019-04-10 RX ORDER — ATORVASTATIN CALCIUM 20 MG/1
20 TABLET, FILM COATED ORAL DAILY
Qty: 90 TAB | Refills: 1 | Status: SHIPPED | OUTPATIENT
Start: 2019-04-10 | End: 2019-10-08 | Stop reason: ALTCHOICE

## 2019-04-10 NOTE — PROGRESS NOTES
1. Have you been to the ER, urgent care clinic since your last visit? Hospitalized since your last visit? No    2. Have you seen or consulted any other health care providers outside of the [address] [place] [address] since your last visit? Include any pap smears or colon screening.  No

## 2019-04-29 NOTE — PROGRESS NOTES
[facility]    CC: F/U for Chronic Disease Management    HPI:     Elevated Lipoprotein (a):  -Had been taking statin to reduce ASCVD risk  -Reports the medication is causing him to be dizzy  -Would like a lower dose of the medication  -Not following any exercise regimen      HTN:  -Does not check BP at home  -Has been taking BP medication as prescribed  -Denies any known side effects or issues with his medication, although notes some recurrent issues with dizziness that he feels is 2/2 statin therapy  -Denies any falls, but continues to have occasional feelings of instability  -Not following any exercise regimen        Parkinson's Disease:  - Has been taking medication as prescribed  -Denies any known side effects or issues with his medication, although notes some recurrent issues with dizziness that he feels is 2/2 statin therapy  -Reports that the medication has helped significantly with his tremor and he would prefer to remain on medication even though it maybe contributing to his dizzy spells  -Denies any falls 2/2 the dizzy spells      ROS: Positive items marked in RED  CON: fever, chills  Cardiovascular: palpitations, CP  Resp: SOB, cough  GI: nausea, vomiting, diarrhea  : dysuria, hematuria      Past Medical History:   Diagnosis Date    CKD [facility]ronic [facility] III [facility]     Dementia     Diabetes mellitus, type II ([facility]     HTN (hypertension)     Kidney problem     Parkinson's disease ([facility]        No past surgical history on file.     Family History   Problem Relation Age of Onset    No Known Problems Mother     No Known Problems Father     Diabetes Other     Hypertension Other        Social History     Socioeconomic History    Marital status: UNKNOWN     Spouse name: Not on file    Number of children: Not on file    Years of education: Not on file    Highest education level: Not on file   Tobacco Use    Smoking status: Never Smoker    Smokeless tobacco: Never Used Substance and Sexual Activity    Alcohol use: No   Social History Narrative    ** Merged History Encounter **            No Known Allergies      Current Outpatient Medications:     carbidopa-levodopa (SINEMET)  mg per tablet, Take 1 Tab by mouth three (3) times daily. , Disp: 180 Tab, Rfl: 6    amLODIPine (NORVASC) 5 mg tablet, Take 1 Tab by mouth daily. , Disp: 90 Tab, Rfl: 2    atorvastatin (LIPITOR) 40 mg tablet, Take 1 Tab by mouth daily. Indications: primary prevention of heart attack, Disp: 90 Tab, Rfl: 1    Physical Exam:      /74   Pulse 81   Temp 96.9 °F (36.1 °C) (Oral)   Resp 12   Ht 5' 8\" (1.727 m)   Wt 124 lb (56.2 kg)   BMI 18.85 kg/m²     General:  WD, WN, NAD  Eyes: sclera clear bilaterally, no discharge noted, eyelids normal in appearance  HENT: NCAT  Lungs: CTAB, normal respiratory effort and rate  CV: RRR, no MRGs  ABD: soft, non-tender, non-distended, normal bowel sounds  Ext: No peripheral edema or digital cyanosis noted  Skin: normal temperature, turgor, color, and texture. Psych: alert, normal affect  Neuro: Stuttering speech, notable difficulty verbally expressing thoughts, shuffling gait, mild resting tremor noted of right arm/hand (Improved from prior visit). Assessment/Plan     HTN, Well Controlled:  -10 year ASCVD risk of 22.4%  -Will continue current BP regimen  -F/U in 8 months for Medicare Wellness Visit      Parkinson Disease, Stable:  -Will continue current medication regimen  -Patient reminded that issues with dizziness is common with condition and medication used to treat his condition  -F/U in 8 months for Medicare Wellness Visit      Elevated Lipoprotein (a):  -10 year ASCVD risk of 22.4%  -Will reduce statin dose, per request of patient.  Although reported dizziness is likely due to his parkinson disease and/or Sinemet regimen  -F/U in 8 months for Medicare Wellness Visit        Alka Smith MD  4/10/2019

## 2019-09-03 ENCOUNTER — TELEPHONE (OUTPATIENT)
Dept: FAMILY MEDICINE CLINIC | Age: 79
End: 2019-09-03

## 2019-09-03 NOTE — TELEPHONE ENCOUNTER
Liz Savage calling stating patient is taking  Atorvastatin 1X day, previous 3 times day and patient is experiencing  hand shaking    Noman Haque is not bills of medication change from x3 to x1 . John Thornton ... Per visit notes there has been no change in 2019. Call her back at [phone] as patient of the higher dosage, ig so forward a change of instructions to pharmacy.

## 2019-10-08 ENCOUNTER — OFFICE VISIT (OUTPATIENT)
Dept: FAMILY MEDICINE CLINIC | Age: 79
End: 2019-10-08

## 2019-10-08 VITALS
DIASTOLIC BLOOD PRESSURE: 74 MMHG | HEIGHT: 68 IN | SYSTOLIC BLOOD PRESSURE: 143 MMHG | BODY MASS INDEX: 18.34 KG/M2 | WEIGHT: 121 LBS | RESPIRATION RATE: 12 BRPM | TEMPERATURE: 95.8 F | HEART RATE: 82 BPM

## 2019-10-08 DIAGNOSIS — I10 ESSENTIAL HYPERTENSION WITH GOAL BLOOD PRESSURE LESS THAN 130/80: ICD-10-CM

## 2019-10-08 DIAGNOSIS — J06.9 UPPER RESPIRATORY TRACT INFECTION, UNSPECIFIED TYPE: Primary | ICD-10-CM

## 2019-10-08 DIAGNOSIS — E78.41 ELEVATED LIPOPROTEIN(A): ICD-10-CM

## 2019-10-08 RX ORDER — BROMPHENIRAMINE MALEATE, PSEUDOEPHEDRINE HYDROCHLORIDE, AND DEXTROMETHORPHAN HYDROBROMIDE 2; 30; 10 MG/5ML; MG/5ML; MG/5ML
5 SYRUP ORAL
Qty: 118 ML | Refills: 0 | Status: SHIPPED | OUTPATIENT
Start: 2019-10-08 | End: 2019-12-02

## 2019-10-08 RX ORDER — ROSUVASTATIN CALCIUM 20 MG/1
20 TABLET, COATED ORAL
Qty: 90 TAB | Refills: 2 | Status: SHIPPED | OUTPATIENT
Start: 2019-10-08 | End: 2020-04-13 | Stop reason: SDUPTHER

## 2019-10-08 NOTE — PROGRESS NOTES
[facility]    CC: Cough    HPI:     Cough:  -Issue for the past 2 days  -Not productive  -Has been worsening      Elevated Lipoprotein (a):  -Stopped taking atorvastatin, as it was thought to possibly be causing him to have no appetite for the past 2 days  -Not following any regular exercise regimen      Hypertension:  -Stopped taking BP medication, due to cough  -Does not check BP at home  -Not following any regular exercise regimen      ROS: Positive items marked in RED  CON: fever, chills  Cardiovascular: palpitations, CP  Resp: SOB, cough  GI: vomiting, diarrhea  : dysuria, hematuria      Past Medical History:   Diagnosis Date    CKD (chronic kidney disease), stage III ([facility]     Dementia     [facility], [facility] II [facility]     HTN (hypertension)     Kidney problem     Parkinson's disease ([facility]        History reviewed. No pertinent surgical history. Family History   Problem Relation Age of Onset    No Known Problems Mother     No Known Problems Father     Diabetes Other     Hypertension Other        Social History     Socioeconomic History    Marital status: SINGLE     Spouse name: Not on file    Number of children: Not on file    Years of education: Not on file    Highest education level: Not on file   Tobacco Use    Smoking status: Never Smoker    Smokeless tobacco: Never Used   Substance and Sexual Activity    Alcohol use: No   Social History Narrative    ** Merged History Encounter **            No Known Allergies      Current Outpatient Medications:     rosuvastatin (CRESTOR) 20 mg tablet, Take 1 Tab by mouth nightly., Disp: 90 Tab, Rfl: 2    brompheniramine-pseudoeph-DM (DIMETAPP) 2-30-10 mg/5 mL syrup, Take 5 mL by mouth four (4) times daily as needed for Cough. , Disp: 118 mL, Rfl: 0    carbidopa-levodopa (SINEMET)  mg per tablet, Take 1 Tab by mouth three (3) times daily. , Disp: 180 Tab, Rfl: 6    amLODIPine (NORVASC) 5 mg tablet, Take 1 Tab by mouth daily., Disp: 90 Tab, Rfl: 2    Physical Exam:      /74   Pulse 82   Temp 95.8 °F (35.4 °C) (Oral)   Resp 12   Ht 5' 8\" (1.727 m)   Wt 121 lb (54.9 kg)   BMI 18.40 kg/m²     General:  WD, WN, NAD, drooling slightly  Eyes: sclera clear bilaterally, no discharge noted, eyelids normal in appearance  HENT: NCAT, nasal turbinates enlarged bilaterally  Lungs: CTAB, normal respiratory effort and rate  CV: RRR, no MRGs  ABD: soft, non-tender, non-distended, normal bowel sounds  Skin: normal temperature, turgor, color, and texture  Psych: alert and oriented to person, place and situation, normal affect  Neuro: Stuttering speech, notable difficulty verbally expressing thoughts, resting tremor noted of upper extremity      Assessment/Plan     URI:  -Likely viral etiology  -Discussed with patient that his symptoms are likely due to a viral infection and unrelated to his medications  -Started on Dimetapp regimen  -Follow-up in 1 month for chronic disease management      Hypertension, inadequately controlled:  -Last 10 year ASCVD risk calculated to be 22.4%  -Not at goal BP of less than 130/80  -Patient advised to resume taking his BP medication  -Follow-up in 1 month for chronic disease management      Elevated Lipoprotein (a):  -Patient advised to resume statin, as his recent loss of appetite is likely related to his viral illness  -Follow-up in 1 month for chronic disease management        Christene Rubinstein, MD  10/8/2019, 11:20 AM

## 2019-10-08 NOTE — PROGRESS NOTES
1. Have you been to the ER, urgent care clinic since your last visit? Hospitalized since your last visit? No    2. Have you seen or consulted any other health care providers outside of the [address] [place] [address] since your last visit? Include any pap smears or colon screening.  No

## 2019-11-19 ENCOUNTER — OFFICE VISIT (OUTPATIENT)
Dept: FAMILY MEDICINE CLINIC | Age: 79
End: 2019-11-19

## 2019-11-19 VITALS
WEIGHT: 122 LBS | HEART RATE: 84 BPM | HEIGHT: 68 IN | TEMPERATURE: 98.1 F | SYSTOLIC BLOOD PRESSURE: 122 MMHG | BODY MASS INDEX: 18.49 KG/M2 | DIASTOLIC BLOOD PRESSURE: 68 MMHG | RESPIRATION RATE: 14 BRPM

## 2019-11-19 DIAGNOSIS — I10 ESSENTIAL HYPERTENSION WITH GOAL BLOOD PRESSURE LESS THAN 130/80: ICD-10-CM

## 2019-11-19 DIAGNOSIS — G20 PARKINSON'S SYNDROME (HCC): Primary | ICD-10-CM

## 2019-11-19 DIAGNOSIS — R05.9 COUGH: ICD-10-CM

## 2019-11-19 RX ORDER — AMLODIPINE BESYLATE 5 MG/1
5 TABLET ORAL DAILY
Qty: 90 TAB | Refills: 2 | Status: SHIPPED | OUTPATIENT
Start: 2019-11-19 | End: 2020-07-16

## 2019-11-19 RX ORDER — CARBIDOPA AND LEVODOPA 25; 100 MG/1; MG/1
1 TABLET ORAL 3 TIMES DAILY
Qty: 180 TAB | Refills: 6 | Status: SHIPPED | OUTPATIENT
Start: 2019-11-19 | End: 2021-01-14 | Stop reason: SDUPTHER

## 2019-11-19 NOTE — PROGRESS NOTES
Ebonie Lopez    CC: Follow-up for cough and chronic disease management    HPI:     Cough:  -Reports cough has improved since last visit  -Has slight cough occasionally      Hypertension:  -Has resumed taking his blood pressure medication as discussed at last visit  -Denies any known side effects or issues with blood pressure medication  -Does not check blood pressure at home  -Not following a regular exercise regimen      Parkinson Syndrome:  -Reports tremor and drooling has gotten worse  -Has not been taking his medication due to it not being at his pharmacy      ROS: Positive items marked in RED  CON: fever, chills  Cardiovascular: palpitations, CP  Resp: SOB, cough  GI: nausea, vomiting, diarrhea  : dysuria, hematuria      Past Medical History:   Diagnosis Date    [facility]     Dementia ([facility]     Diabetes mellitus, type II ([facility]     HTN (hypertension)     Kidney problem     Parkinson's disease ([facility]        No past surgical history on file. Family History   Problem Relation Age of Onset    No Known Problems Mother     No Known Problems Father     Diabetes Other     Hypertension Other        Social History     Socioeconomic History    Marital status: UNKNOWN     Spouse name: Not on file    Number of children: Not on file    Years of education: Not on file    Highest education level: Not on file   Tobacco Use    Smoking status: Never Smoker    Smokeless tobacco: Never Used   Substance and Sexual Activity    Alcohol use: No   Social History Narrative    ** Merged History Encounter **            No Known Allergies      Current Outpatient Medications:     rosuvastatin (CRESTOR) 20 mg tablet, Take 1 Tab by mouth nightly., Disp: 90 Tab, Rfl: 2    amLODIPine (NORVASC) 5 mg tablet, Take 1 Tab by mouth daily. , Disp: 90 Tab, Rfl: 2    brompheniramine-pseudoeph-DM (DIMETAPP) 2-30-10 mg/5 mL syrup, Take 5 mL by mouth four (4) times daily as needed for Cough., Disp: 118 mL, Rfl: 0    carbidopa-levodopa (SINEMET)  mg per tablet, Take 1 Tab by mouth three (3) times daily. , Disp: 180 Tab, Rfl: 6    Physical Exam:      /68   Pulse 84   Temp 98.1 °F (36.7 °C) (Oral)   Resp 14   Ht 5' 8\" (1.727 m)   Wt 122 lb (55.3 kg)   BMI 18.55 kg/m²     General:  WD, WN, NAD, drooling significantly  Eyes: sclera clear bilaterally, no discharge noted, eyelids normal in appearance  HENT: NCAT  Lungs: CTAB, normal respiratory effort and rate  CV: RRR, no MRGs  ABD: soft, non-tender, non-distended, normal bowel sounds  Skin: normal temperature, turgor, color, and texture  Psych: alert and oriented to person, place and situation, normal affect  Neuro: stuttering speech, notable difficulty verbal expressing thoughts, moving all extremities, resting tremor noted of upper extremity      Assessment/Plan     Parkinson's Syndrome, inadequately controlled:  -Restarted on prior Sinemet regimen  -Advised to contact office if he has any issues picking up his medications at the pharmacy  -Follow-up in 1 month for Medicare wellness visit      Hypertension, well controlled:  -At goal BP of less than 130/80  -Will continue current blood pressure medication regimen  -Follow-up in 1 month for Medicare wellness visit      Cough, resolved/improved:  -Prior reported cough was likely due to viral URI  -Currently no indication for further intervention  -Follow-up in 1 month for Medicare wellness visit        Kenyetta Cordova MD  11/19/2019, 1:39 PM

## 2019-11-19 NOTE — PROGRESS NOTES
1. Have you been to the ER, urgent care clinic since your last visit? Hospitalized since your last visit? No    2. Have you seen or consulted any other health care providers outside of the [address] [place] [address] since your last visit? Include any pap smears or colon screening. No     Patient has not been taking carbidopa-levodopa (SINEMET)  mg per tablet per caregiver, Dub Gum didn't give me that at the pharmacy. He has not been taking it. \"

## 2019-12-11 ENCOUNTER — OFFICE VISIT (OUTPATIENT)
Dept: FAMILY MEDICINE CLINIC | Age: 79
End: 2019-12-11

## 2019-12-11 VITALS
RESPIRATION RATE: 14 BRPM | DIASTOLIC BLOOD PRESSURE: 63 MMHG | TEMPERATURE: 97.1 F | HEIGHT: 68 IN | HEART RATE: 81 BPM | BODY MASS INDEX: 20.16 KG/M2 | WEIGHT: 133 LBS | SYSTOLIC BLOOD PRESSURE: 119 MMHG

## 2019-12-11 DIAGNOSIS — Z23 ENCOUNTER FOR IMMUNIZATION: ICD-10-CM

## 2019-12-11 DIAGNOSIS — Z71.89 ADVANCED DIRECTIVES, COUNSELING/DISCUSSION: ICD-10-CM

## 2019-12-11 DIAGNOSIS — G20 PARKINSON DISEASE (HCC): ICD-10-CM

## 2019-12-11 DIAGNOSIS — Z00.00 MEDICARE ANNUAL WELLNESS VISIT, SUBSEQUENT: Primary | ICD-10-CM

## 2019-12-11 DIAGNOSIS — R41.89 COGNITIVE IMPAIRMENT: ICD-10-CM

## 2019-12-11 NOTE — PATIENT INSTRUCTIONS
Medicare Wellness Visit, Male The best way to live healthy is to have a lifestyle where you eat a well-balanced diet, exercise regularly, limit alcohol use, and quit all forms of tobacco/nicotine, if applicable. Regular preventive services are another way to keep healthy. Preventive services (vaccines, screening tests, monitoring & exams) can help personalize your care plan, which helps you manage your own care. Screening tests can find health problems at the earliest stages, when they are easiest to treat. [facility] follows the current, evidence-based guidelines published by the [facility] ([facility]) when recommending preventive services for our patients. Because we follow these guidelines, sometimes recommendations change over time as research supports it. (For example, a prostate screening blood test is no longer routinely recommended for men with no symptoms). Of course, you and your doctor may decide to screen more often for some diseases, based on your risk and co-morbidities (chronic disease you are already diagnosed with). Preventive services for you include: - Medicare offers their members a free annual wellness visit, which is time for you and your primary care provider to discuss and plan for your preventive service needs. Take advantage of this benefit every year! 
-All adults over age 72 should receive the recommended pneumonia vaccines. Current USPSTF guidelines recommend a series of two vaccines for the best pneumonia protection.  
-All adults should have a flu vaccine yearly and tetanus vaccine every 10 years. 
-All adults age 48 and older should receive the shingles vaccines (series of two vaccines).       
-All adults age 38-68 who are overweight should have a diabetes screening test once every three years.  
-Other screening tests & preventive services for persons with diabetes include: an eye exam to screen for diabetic retinopathy, a kidney function test, a foot exam, and stricter control over your cholesterol.  
-Cardiovascular screening for adults with routine risk involves an electrocardiogram (ECG) at intervals determined by the provider.  
-Colorectal cancer screening should be done for adults age 54-65 with no increased risk factors for colorectal cancer. There are a number of acceptable methods of screening for this type of cancer. Each test has its own benefits and drawbacks. Discuss with your provider what is most appropriate for you during your annual wellness visit. The different tests include: colonoscopy (considered the best screening method), a fecal occult blood test, a fecal DNA test, and sigmoidoscopy. 
-All adults born between [facility] should be screened once for Hepatitis C. 
-An Abdominal Aortic Aneurysm (AAA) Screening is recommended for men age 73-68 who has ever smoked in their lifetime. Here is a list of your current Health Maintenance items (your personalized list of preventive services) with a due date: 
Health Maintenance Due Topic Date Due  
 Flu Vaccine  08/01/2019  Pneumococcal Vaccine (2 of 2 - PPSV23) 12/05/2019 [address] [place] [address] Annual Well Visit  12/06/2019 Advance Directives: Care Instructions Your Care Instructions An advance directive is a legal way to state your wishes at the end of your life. It tells your family and your doctor what to do if you can no longer say what you want. There are two main types of advance directives. You can change them any time that your wishes change. · A living will tells your family and your doctor your wishes about life support and other treatment. · A durable power of  for health care lets you name a person to make treatment decisions for you when you can't speak for yourself. This person is called a health care agent.  
If you do not have an advance directive, decisions about your medical care may be made by a doctor or a  who doesn't know you. It may help to think of an advance directive as a gift to the people who care for you. If you have one, they won't have to make tough decisions by themselves. Follow-up care is a key part of your treatment and safety. Be sure to make and go to all appointments, and call your doctor if you are having problems. It's also a good idea to know your test results and keep a list of the medicines you take. How can you care for yourself at home? · Discuss your wishes with your loved ones and your doctor. This way, there are no surprises. · Many states have a unique form. Or you might use a universal form that has been approved by many states. This kind of form can sometimes be completed and stored online. Your electronic copy will then be available wherever you have a connection to the Internet. In most cases, doctors will respect your wishes even if you have a form from a different state. · You don't need a  to do an advance directive. But you may want to get legal advice. · Think about these questions when you prepare an advance directive: 
? Who do you want to make decisions about your medical care if you are not able to? Many people choose a family member or close friend. ? Do you know enough about life support methods that might be used? If not, talk to your doctor so you understand. ? What are you most afraid of that might happen? You might be afraid of having pain, losing your independence, or being kept alive by machines. ? Where would you prefer to die? Choices include your home, a hospital, or a nursing home. ? Would you like to have information about hospice care to support you and your family? ? Do you want to donate organs when you die? ? Do you want certain Worship practices performed before you die? If so, put your wishes in the advance directive. · Read your advance directive every year, and make changes as needed. When should you call for help? Be sure to contact your doctor if you have any questions. Where can you learn more? Go to http://nikky-terry.info/. Enter R264 in the search box to learn more about \"Advance Directives: Care Instructions. \" Current as of: April 1, 2019 Content Version: 12.2 © 5596-6860 [employer]. Care instructions adapted under license by [employer] (which disclaims liability or warranty for this information). If you have questions about a medical condition or this instruction, always ask your healthcare professional. [address] any warranty or liability for your use of this information.

## 2019-12-11 NOTE — PROGRESS NOTES
This is the Subsequent Medicare Annual Wellness Exam, performed 12 months or more after the Initial AWV or the last Subsequent AWV I have reviewed the patient's medical history in detail and updated the computerized patient record. History Patient Active Problem List  
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  
 Essential hypertension with goal blood pressure less than 130/80 I10  Parkinson disease ([facility] Samara Quach Past Medical History:  
Diagnosis Date  CKD (chronic kidney disease), stage III ([facility]  Dementia ([facility]  Diabetes mellitus, type II ([facility]  HTN (hypertension)  Kidney problem  Parkinson's disease ([facility] No past surgical history on file. Current Outpatient Medications Medication Sig Dispense Refill  carbidopa-levodopa (SINEMET)  mg per tablet Take 1 Tab by mouth three (3) times daily. 180 Tab 6  
 amLODIPine (NORVASC) 5 mg tablet Take 1 Tab by mouth daily. 90 Tab 2  
 rosuvastatin (CRESTOR) 20 mg tablet Take 1 Tab by mouth nightly. 90 Tab 2 No Known Allergies Family History Problem Relation Age of Onset  No Known Problems Mother  No Known Problems Father  Diabetes Other  Hypertension Other Social History Tobacco Use  Smoking status: Never Smoker  Smokeless tobacco: Never Used Substance Use Topics  Alcohol use: No  
 
 
Depression Risk Factor Screening:  
 
3 most recent PHQ Screens 10/8/2019 Little interest or pleasure in doing things Not at all Feeling down, depressed, irritable, or hopeless Not at all Total Score PHQ 2 0 Alcohol Risk Factor Screening (MALE > 65): Do you average more 1 drink per night or more than 7 drinks a week: {Yes/No:076893::\"No\"} In the past three months have you have had more than 4 drinks containing alcohol on one occasion: {Yes/No:058900::\"No\"} Functional Ability and Level of Safety:  
Hearing: {Desc; hearing loss:47954::\"Hearing is good. \"} 
 
 Activities of Daily Living: The home contains: {AWV Home SFSQLI:00658::\"[place] safety equipment. \"} 
{Functional ADL's:57564::\"Patient does total self care\"} Ambulation: {Patient ambulates:30501::\"with no difficulty\"} Fall Risk: 
Fall Risk Assessment, last 12 mths 10/8/2019 Able to walk? Yes Fall in past 12 months? Yes Fall with injury? No  
Number of falls in past 12 months 2 Fall Risk Score 2 Abuse Screen: 
{Abuse Screen:::\"Patient is not abused\"} Cognitive Screening Has your family/caregiver stated any concerns about your memory: {AWV no/yes:79652::\"no\"} {Cognitive Screenin} Patient Care Team  
Patient Care Team: 
Rylie Preston MD as PCP - [facility]) Rylie Preston MD as PCP - [facility] Empaneled Provider Other, MD Aureliano 
 
Assessment/Plan Education and counseling provided: 
{Education List, choose as appropriate:::\"Are appropriate based on today's review and evaluation\"} Diagnoses and all orders for this visit: 
 
1. Medicare annual wellness visit, subsequent Health Maintenance Due Topic Date Due  Influenza Age 5 to Adult  2019  Pneumococcal 65+ years (2 of 2 - PPSV23) 2019  MEDICARE YEARLY EXAM  2019

## 2019-12-11 NOTE — PROGRESS NOTES
1. Have you been to the ER, urgent care clinic since your last visit? Hospitalized since your last visit? No    2. Have you seen or consulted any other health care providers outside of the [address] [place] [address] since your last visit? Include any pap smears or colon screening.  No

## 2019-12-11 NOTE — ACP (ADVANCE CARE PLANNING)
Advance Care Planning (ACP) Provider Note - Comprehensive     Date of ACP Conversation: 12/11/19  Persons included in Conversation:  patient and friend  Length of ACP Conversation in minutes:  <16 minutes (Non-Billable)    Authorized Decision Maker (if patient is incapable of making informed decisions):    This person is:  Friend - [address] [place] [address] ACP for ALL Patients with Decision Making Capacity:   Exploration of values, goals, and preferences if recovery is not expected, even with continued medical treatment in the event of: Imminent death  Severe, permanent brain injury    Review of Existing Advance Directive:  N/A    For Serious or Chronic Illness:  Understanding of medical condition      Interventions Provided:  Entered DNR order (If yes, complete Durable DNR form)

## 2019-12-11 NOTE — PROGRESS NOTES
This is the Subsequent Medicare Annual Wellness Exam, performed 12 months or more after the Initial AWV or the last Subsequent AWV    I have reviewed the patient's medical history in detail and updated the computerized patient record. 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    Essential hypertension with goal blood pressure less than 130/80 I10    Parkinson disease ([facility] Emily Castillo     Past Medical History:   Diagnosis Date    CKD (chronic kidney disease), stage III ([facility]     Dementia ([facility]     Diabetes mellitus, type II ([facility]     HTN (hypertension)     Kidney problem     Parkinson's disease ([facility]       No past surgical history on file. Current Outpatient Medications   Medication Sig Dispense Refill    carbidopa-levodopa (SINEMET)  mg per tablet Take 1 Tab by mouth three (3) times daily. 180 Tab 6    amLODIPine (NORVASC) 5 mg tablet Take 1 Tab by mouth daily. 90 Tab 2    rosuvastatin (CRESTOR) 20 mg tablet Take 1 Tab by mouth nightly. 80 Tab 2     No Known Allergies    Family History   Problem Relation Age of Onset    No Known Problems Mother     No Known Problems Father     Diabetes Other     Hypertension Other      Social History     Tobacco Use    Smoking status: Never Smoker    Smokeless tobacco: Never Used   Substance Use Topics    Alcohol use: No       Depression Risk Factor Screening:     3 most recent PHQ Screens 10/8/2019   Little interest or pleasure in doing things Not at all   Feeling down, depressed, irritable, or hopeless Not at all   Total Score PHQ 2 0       Alcohol Risk Factor Screening (MALE > 65): Do you average more 1 drink per night or more than 7 drinks a week: No    In the past three months have you have had more than 4 drinks containing alcohol on one occasion: No      Functional Ability and Level of Safety:   Hearing: Whisper Test done with normal results. Activities of Daily Living:   The home contains: no safety equipment. Patient needs help with:  transportation, shopping, preparing meals, laundry, housework and managing medications    Ambulation: with no difficulty    Fall Risk:  Fall Risk Assessment, last 12 mths 10/8/2019   Able to walk? Yes   Fall in past 12 months? Yes   Fall with injury? No   Number of falls in past 12 months 2   Fall Risk Score 2       Abuse Screen:  Patient is not abused    Cognitive Screening   Has your family/caregiver stated any concerns about your memory: yes - Caregiver  Cognitive Screening: Abnormal - Mini Cog Test    Patient Care Team   Patient Care Team:  Anna Mclaughlin MD as PCP - General (Family Practice)  Anna Mclaughlin MD as PCP - [facility] Empaneled Provider  Other, MD Aureliano    Assessment/Plan   Education and counseling provided:  Are appropriate based on today's review and evaluation  End-of-Life planning (with patient's consent)  Pneumococcal Vaccine  Influenza Vaccine    Diagnoses and all orders for this visit:    1. Medicare annual wellness visit, subsequent    2. Parkinson disease ([facility]  -     REFERRAL TO NEUROLOGY    3. Cognitive impairment  -     REFERRAL TO NEUROLOGY    4. Advanced directives, counseling/discussion  -     DO NOT RESUSCITATE    5.  Encounter for immunization  -     ADMIN INFLUENZA VIRUS VAC  -     PNEUMOCOCCAL POLYSACCHARIDE VACCINE, 23-VALENT, ADULT OR IMMUNOSUPPRESSED PT DOSE,  -     INFLUENZA VACCINE INACTIVATED (IIV), SUBUNIT, ADJUVANTED, IM  -     ADMIN PNEUMOCOCCAL VACCINE        Health Maintenance Due   Topic Date Due    Influenza Age 5 to Adult  08/01/2019    Pneumococcal 65+ years (2 of 2 - PPSV23) 12/05/2019    MEDICARE YEARLY EXAM  12/06/2019

## 2020-04-13 DIAGNOSIS — E78.41 ELEVATED LIPOPROTEIN(A): ICD-10-CM

## 2020-04-13 RX ORDER — ROSUVASTATIN CALCIUM 20 MG/1
20 TABLET, COATED ORAL
Qty: 90 TAB | Refills: 2 | Status: SHIPPED | OUTPATIENT
Start: 2020-04-13 | End: 2021-01-26 | Stop reason: SDUPTHER

## 2020-07-14 DIAGNOSIS — I10 ESSENTIAL HYPERTENSION WITH GOAL BLOOD PRESSURE LESS THAN 130/80: ICD-10-CM

## 2020-07-16 RX ORDER — AMLODIPINE BESYLATE 5 MG/1
TABLET ORAL
Qty: 90 TAB | Refills: 1 | Status: SHIPPED | OUTPATIENT
Start: 2020-07-16 | End: 2020-11-09

## 2020-08-19 ENCOUNTER — CLINICAL SUPPORT (OUTPATIENT)
Dept: FAMILY MEDICINE CLINIC | Age: 80
End: 2020-08-19

## 2020-08-19 VITALS — SYSTOLIC BLOOD PRESSURE: 121 MMHG | DIASTOLIC BLOOD PRESSURE: 52 MMHG | HEART RATE: 87 BPM

## 2020-08-19 DIAGNOSIS — I10 ESSENTIAL HYPERTENSION WITH GOAL BLOOD PRESSURE LESS THAN 130/80: Primary | ICD-10-CM

## 2020-08-19 NOTE — PROGRESS NOTES
Per  instructions patient presents today for a blood pressure check. Patient seated and both feet flat on the floor. Blood pressure taken and documented. Reported blood pressure to Dr. Darin Curry. Patient is instructed to continue current medications and keep schedule follow up with Dr. Darin Curry.

## 2020-10-26 ENCOUNTER — HOSPITAL ENCOUNTER (OUTPATIENT)
Dept: LAB | Age: 80
Discharge: HOME | End: 2020-10-26
Payer: MEDICARE

## 2020-10-26 ENCOUNTER — OFFICE VISIT (OUTPATIENT)
Dept: FAMILY MEDICINE CLINIC | Age: 80
End: 2020-10-26
Payer: MEDICARE

## 2020-10-26 VITALS
BODY MASS INDEX: 19.55 KG/M2 | SYSTOLIC BLOOD PRESSURE: 145 MMHG | RESPIRATION RATE: 16 BRPM | HEART RATE: 109 BPM | DIASTOLIC BLOOD PRESSURE: 58 MMHG | TEMPERATURE: 97.5 F | WEIGHT: 129 LBS | HEIGHT: 68 IN

## 2020-10-26 DIAGNOSIS — I10 ESSENTIAL HYPERTENSION WITH GOAL BLOOD PRESSURE LESS THAN 130/80: Primary | ICD-10-CM

## 2020-10-26 DIAGNOSIS — E78.41 ELEVATED LIPOPROTEIN(A): ICD-10-CM

## 2020-10-26 DIAGNOSIS — K59.00 CONSTIPATION, UNSPECIFIED CONSTIPATION TYPE: ICD-10-CM

## 2020-10-26 DIAGNOSIS — I10 ESSENTIAL HYPERTENSION WITH GOAL BLOOD PRESSURE LESS THAN 130/80: ICD-10-CM

## 2020-10-26 DIAGNOSIS — G20 PARKINSON DISEASE (HCC): ICD-10-CM

## 2020-10-26 LAB
ALBUMIN SERPL-MCNC: 4.3 G/DL (ref 3.4–5)
ALBUMIN/GLOB SERPL: 0.9 {RATIO} (ref 0.8–1.7)
ALP SERPL-CCNC: 81 U/L (ref 45–117)
ALT SERPL-CCNC: 32 U/L (ref 16–61)
ANION GAP SERPL CALC-SCNC: 8 MMOL/L (ref 3–18)
AST SERPL-CCNC: 42 U/L (ref 10–38)
BILIRUB SERPL-MCNC: 0.6 MG/DL (ref 0.2–1)
BUN SERPL-MCNC: 18 MG/DL (ref 7–18)
BUN/CREAT SERPL: 10 (ref 12–20)
CALCIUM SERPL-MCNC: 9.5 MG/DL (ref 8.5–10.1)
CHLORIDE SERPL-SCNC: 101 MMOL/L (ref 100–111)
CHOLEST SERPL-MCNC: 134 MG/DL
CO2 SERPL-SCNC: 29 MMOL/L (ref 21–32)
CREAT SERPL-MCNC: 1.74 MG/DL (ref 0.6–1.3)
GLOBULIN SER CALC-MCNC: 4.6 G/DL (ref 2–4)
GLUCOSE SERPL-MCNC: 275 MG/DL (ref 74–99)
HDLC SERPL-MCNC: 78 MG/DL (ref 40–60)
HDLC SERPL: 1.7 {RATIO} (ref 0–5)
LDLC SERPL CALC-MCNC: 46.4 MG/DL (ref 0–100)
LIPID PROFILE,FLP: ABNORMAL
POTASSIUM SERPL-SCNC: 3.9 MMOL/L (ref 3.5–5.5)
PROT SERPL-MCNC: 8.9 G/DL (ref 6.4–8.2)
SODIUM SERPL-SCNC: 138 MMOL/L (ref 136–145)
TRIGL SERPL-MCNC: 48 MG/DL (ref ?–150)
VLDLC SERPL CALC-MCNC: 9.6 MG/DL

## 2020-10-26 PROCEDURE — 80061 LIPID PANEL: CPT

## 2020-10-26 PROCEDURE — 36415 COLL VENOUS BLD VENIPUNCTURE: CPT

## 2020-10-26 PROCEDURE — 80053 COMPREHEN METABOLIC PANEL: CPT

## 2020-10-26 PROCEDURE — G0463 HOSPITAL OUTPT CLINIC VISIT: HCPCS | Performed by: FAMILY MEDICINE

## 2020-10-26 PROCEDURE — 99214 OFFICE O/P EST MOD 30 MIN: CPT | Performed by: FAMILY MEDICINE

## 2020-10-26 RX ORDER — AMOXICILLIN 250 MG
1 CAPSULE ORAL DAILY
Qty: 30 TAB | Refills: 1 | Status: SHIPPED | OUTPATIENT
Start: 2020-10-26 | End: 2021-01-14 | Stop reason: SDUPTHER

## 2020-10-26 NOTE — PROGRESS NOTES
[facility]    CC: F/U for Chronic Disease Management    HPI:       HTN:  -Taking BP medication as prescribed  -Denies any side effects or issue with his medication regimen  -Does not check his BP at home  -Diet is unchanged since last visit  -Exercising daily for 2 minutes      Pakinson Disease:  -Saw neurology on 10/20/20  -No changes done to his medication  -Next appointment with neuroloogy is on 3/24/2021  -Taking Sinemet as prescribed  -Denies any side effects or issue with his medication regimen      Elevated Lipoprotein (a):  -Taking statin as prescribed  -Denies any side effects or issue with his medication regimen  -Diet is unchanged since last visit  -Exercising daily for 2 minutes      Constipation:  -Issue for months  -Issue reported to be constant  -On no prescribed medication for issue      ROS: Positive items marked in RED  CON: fever, chills  Cardiovascular: palpitations, CP  Resp: SOB, cough  GI: nausea, vomiting, diarrhea  : dysuria, hematuria      Past Medical History:   Diagnosis Date    CKD (chronic kidney disease), stage III     [facility]     Diabetes mellitus, type II ([facility]     HTN (hypertension)     Kidney problem     Parkinson's disease ([facility]        History reviewed. No pertinent surgical history. Family History   Problem Relation Age of Onset    No Known Problems Mother     No Known Problems Father     Diabetes Other     Hypertension Other        Social History     Tobacco Use    Smoking status: Never Smoker    Smokeless tobacco: Never Used   Substance Use Topics    Alcohol use: No    Drug use: Not on file       No Known Allergies      Current Outpatient Medications:     amLODIPine (NORVASC) 5 mg tablet, TAKE ONE TABLET BY MOUTH DAILY, Disp: 90 Tab, Rfl: 1    rosuvastatin (CRESTOR) 20 mg tablet, Take 1 Tab by mouth nightly., Disp: 90 Tab, Rfl: 2    carbidopa-levodopa (SINEMET)  mg per tablet, Take 1 Tab by mouth three (3) times daily. , Disp: 180 Tab, Rfl: 6    Physical Exam:      BP (!) 145/58   Pulse (!) 109   Temp 97.5 °F (36.4 °C) (Temporal)   Resp 16   Ht 5' 8\" (1.727 m)   Wt 129 lb (58.5 kg)   BMI 19.61 kg/m²     General:  WD, WN, NAD, drooling slightly  Eyes: sclera clear bilaterally, no discharge noted, eyelids normal in appearance  HENT: NCAT  Lungs: CTAB, normal respiratory effort and rate  CV: RRR, no MRGs  ABD: soft, non-tender, non-distended, normal bowel sounds  Skin: normal temperature, turgor, color, and texture  Psych: alert and oriented to person, place and situation, normal affect  Neuro: stuttering speech, notable difficulty verbal expressing thoughts, moving all extremities, resting tremor noted of upper extremity      Assessment/Plan     Hypertension:  -BP not at goal of less than 130/80.  Was at goal at prior visit on current regimen  -Low diastolic BP does not permit any further increase of dose of BP  -Will continue current blood pressure medication regimen  -CBC, CMP, and fasting lipid panel ordered  -Follow-up in 1 month      Elevated Lipoprotein (a):  -Will continue current statin regimen for CVA/MI prevention  -CMP and lipid panel ordered  -Follow-up in 1 month      Parkinson's Syndrome:  -Will continue current Sinemet regimen  -Follow-up in 1 month      Constipation:  -Likely 2/2 PD and/or Sinement regimen  -Started on Pericolace regimen  -Follow-up in 1 month        Radha Diaz MD  10/26/2020, 2:41 PM

## 2020-10-26 NOTE — PROGRESS NOTES
1. Have you been to the ER, urgent care clinic since your last visit? Hospitalized since your last visit? No    2. Have you seen or consulted any other health care providers outside of the [address] [place] [address] since your last visit? Include any pap smears or colon screening.  No

## 2020-11-09 DIAGNOSIS — I10 ESSENTIAL HYPERTENSION WITH GOAL BLOOD PRESSURE LESS THAN 130/80: ICD-10-CM

## 2020-11-09 RX ORDER — AMLODIPINE BESYLATE 5 MG/1
TABLET ORAL
Qty: 90 TAB | Refills: 0 | Status: SHIPPED | OUTPATIENT
Start: 2020-11-09 | End: 2021-02-09

## 2020-12-02 ENCOUNTER — OFFICE VISIT (OUTPATIENT)
Dept: FAMILY MEDICINE CLINIC | Age: 80
End: 2020-12-02
Payer: MEDICARE

## 2020-12-02 VITALS
BODY MASS INDEX: 20.16 KG/M2 | WEIGHT: 133 LBS | DIASTOLIC BLOOD PRESSURE: 61 MMHG | TEMPERATURE: 98.4 F | HEIGHT: 68 IN | RESPIRATION RATE: 16 BRPM | SYSTOLIC BLOOD PRESSURE: 136 MMHG | HEART RATE: 80 BPM

## 2020-12-02 DIAGNOSIS — R73.03 PREDIABETES: ICD-10-CM

## 2020-12-02 DIAGNOSIS — K59.00 CONSTIPATION, UNSPECIFIED CONSTIPATION TYPE: ICD-10-CM

## 2020-12-02 DIAGNOSIS — I10 ESSENTIAL HYPERTENSION WITH GOAL BLOOD PRESSURE LESS THAN 130/80: Primary | ICD-10-CM

## 2020-12-02 DIAGNOSIS — E78.41 ELEVATED LIPOPROTEIN(A): ICD-10-CM

## 2020-12-02 DIAGNOSIS — G20 PARKINSON DISEASE (HCC): ICD-10-CM

## 2020-12-02 LAB — HBA1C MFR BLD HPLC: 6 %

## 2020-12-02 PROCEDURE — 83036 HEMOGLOBIN GLYCOSYLATED A1C: CPT | Performed by: FAMILY MEDICINE

## 2020-12-02 PROCEDURE — 99214 OFFICE O/P EST MOD 30 MIN: CPT | Performed by: FAMILY MEDICINE

## 2020-12-02 PROCEDURE — G0463 HOSPITAL OUTPT CLINIC VISIT: HCPCS | Performed by: FAMILY MEDICINE

## 2020-12-02 NOTE — PROGRESS NOTES
[facility]    CC: F/U for Chronic Disease Management    HPI:     HTN:  -Got requested lab work  -Taking BP medication as prescribed  -Denies any side effects or issue with his medication regimen  -Does not check his BP at home  -Diet is unchanged since last visit  -Not following any regular exercise regimen        Parkinson Disease:  -Taking Sinemet as prescribed  -Denies any side effects or issue with his medication regimen  -Reports his tremor has improved since his last visit  -Not following any regular exercise regimen        Elevated Lipoprotein (a):  -Got requested lab work  -Taking statin as prescribed  -Denies any side effects or issue with his medication regimen  -Diet is unchanged since last visit  -Not following any regular exercise regimen      Constipation:  -Taking Pericolace as prescribed  -Denies any side effects or issue with the medication regimen  -Reports improvement in issue with use of the medication  -Diet is unchanged since last visit      ROS: Positive items marked in RED  CON: fever, chills  Cardiovascular: palpitations, CP  Resp: SOB, cough  GI: nausea, vomiting, diarrhea  : dysuria, hematuria    Past Medical History:   Diagnosis Date    CKD (chronic kidney disease), stage III     D[facility] ([facility]     Diabetes mellitus, type II ([facility]     HTN (hypertension)     Kidney problem     Parkinson's disease ([facility]        History reviewed. No pertinent surgical history.     Family History   Problem Relation Age of Onset    No Known Problems Mother     No Known Problems Father     Diabetes Other     Hypertension Other        Social History     Tobacco Use    Smoking status: Never Smoker    Smokeless tobacco: Never Used   Substance Use Topics    Alcohol use: No    Drug use: Not on file       No Known Allergies      Current Outpatient Medications:     amLODIPine (NORVASC) 5 mg tablet, TAKE ONE TABLET BY MOUTH DAILY, Disp: 90 Tab, Rfl: 0    senna-docusate (PERICOLACE) 8.6-50 mg per tablet, Take 1 Tab by mouth daily. , Disp: 30 Tab, Rfl: 1    rosuvastatin (CRESTOR) 20 mg tablet, Take 1 Tab by mouth nightly., Disp: 90 Tab, Rfl: 2    carbidopa-levodopa (SINEMET)  mg per tablet, Take 1 Tab by mouth three (3) times daily. , Disp: 180 Tab, Rfl: 6    Physical Exam:      /61   Pulse 80   Temp 98.4 °F (36.9 °C) (Temporal)   Resp 16   Ht 5' 8\" (1.727 m)   Wt 133 lb (60.3 kg)   BMI 20.22 kg/m²     General:  WD, WN, NAD  Eyes: sclera clear bilaterally, no discharge noted, eyelids normal in appearance  HENT: NCAT  Lungs: CTAB, normal respiratory effort and rate  CV: RRR, no MRGs  ABD: soft, non-tender, non-distended, normal bowel sounds  Skin: normal temperature, turgor, color, and texture  Psych: alert and oriented to person, place and situation, normal affect  Neuro: stuttering speech, notable difficulty verbally expressing his thoughts, moving all extremities, resting tremor noted of upper extremity      Results for Ade Argueta (MRN [id number]):   Ref.  Range 10/26/2020 14:56 12/2/2020 14:29   Sodium Latest Ref Range: 136 - 145 mmol/L 138    Potassium Latest Ref Range: 3.5 - 5.5 mmol/L 3.9    Chloride Latest Ref Range: 100 - 111 mmol/L 101    CO2 Latest Ref Range: 21 - 32 mmol/L 29    Anion gap Latest Ref Range: 3.0 - 18 mmol/L 8    Glucose Latest Ref Range: 74 - 99 mg/dL 275 (H)    BUN Latest Ref Range: 7.0 - 18 MG/DL 18    Creatinine Latest Ref Range: 0.6 - 1.3 MG/DL 1.74 (H)    BUN/Creatinine ratio Latest Ref Range: 12 - 20   10 (L)    Calcium Latest Ref Range: 8.5 - 10.1 MG/DL 9.5    GFR est non-AA Latest Ref Range: >60 ml/min/1.73m2 38 (L)    GFR est AA Latest Ref Range: >60 ml/min/1.73m2 46 (L)    Bilirubin, total Latest Ref Range: 0.2 - 1.0 MG/DL 0.6    Protein, total Latest Ref Range: 6.4 - 8.2 g/dL 8.9 (H)    Albumin Latest Ref Range: 3.4 - 5.0 g/dL 4.3    Globulin Latest Ref Range: 2.0 - 4.0 g/dL 4.6 (H)    A-G Ratio Latest Ref Range: 0.8 - 1.7   0.9    ALT Latest Ref Range: 16 - 61 U/L 32    AST Latest Ref Range: 10 - 38 U/L 42 (H)    Alk.  phosphatase Latest Ref Range: 45 - 117 U/L 81    Triglyceride Latest Ref Range: <150 MG/DL 48    Cholesterol, total Latest Ref Range: <200 MG/    HDL Cholesterol Latest Ref Range: 40 - 60 MG/DL 78 (H)    CHOL/HDL Ratio Latest Ref Range: 0 - 5.0   1.7    VLDL, calculated Latest Units: MG/DL 9.6    LDL, calculated Latest Ref Range: 0 - 100 MG/DL 46.4    Hemoglobin A1c (POC) Latest Units: %  6.0       Assessment/Plan     Hypertension:  -BP not at goal of less than 375/54  -Diastolic BP in low 09M makes further strengthening of his BP medication regimen inadvisable  -Will continue current blood pressure medication regimen  -Follow-up in 2 weeks for Medicare Wellness        Elevated Lipoprotein (a):  -Will continue current statin regimen for CVA/MI prevention  -Follow-up in 2 weeks for Medicare Wellness        Parkinson's Syndrome:  -Will continue current Sinemet regimen  -Follow-up in 2 weeks for Medicare Wellness      Prediabetes:  -Counseled on diagnosis and recommended care  -Follow-up in 2 weeks for Medicare Wellness      Constipation, improving:  -Likely 2/2 PD and/or Sinement regimen  -Will continue current Pericolace regimen  -Advised on importance of drinking lots of water  -Follow-up in 2 weeks for Medicare Wellness       Aaliyah Yang MD  12/2/2020, 1:54 PM

## 2020-12-02 NOTE — PROGRESS NOTES
1. Have you been to the ER, urgent care clinic since your last visit? Hospitalized since your last visit? No    2. Have you seen or consulted any other health care providers outside of the [address] [place] [address] since your last visit? Include any pap smears or colon screening.  No

## 2020-12-23 PROBLEM — R73.03 PREDIABETES: Status: ACTIVE | Noted: 2020-12-23

## 2020-12-24 ENCOUNTER — VIRTUAL VISIT (OUTPATIENT)
Dept: FAMILY MEDICINE CLINIC | Age: 80
End: 2020-12-24
Payer: MEDICARE

## 2020-12-24 DIAGNOSIS — Z71.89 ADVANCED DIRECTIVES, COUNSELING/DISCUSSION: ICD-10-CM

## 2020-12-24 DIAGNOSIS — Z00.00 MEDICARE ANNUAL WELLNESS VISIT, SUBSEQUENT: ICD-10-CM

## 2020-12-24 PROCEDURE — G0439 PPPS, SUBSEQ VISIT: HCPCS | Performed by: FAMILY MEDICINE

## 2020-12-24 NOTE — ACP (ADVANCE CARE PLANNING)
Advance Care Planning       Advance Care Planning (ACP) Physician/NP/PA (Provider) Conversation        Date of ACP Conversation: 12/9/2020    Conversation Conducted with:   Patient with Decision Making Fiorella Campos Maker:    Current Designated Health Care Decision Maker:   (If there is a valid [place] named in the [address]" box in the ACP activity, but it is not visible above, be sure to open that field and then select the health care decision maker relationship (ie \"primary\") in the blank space to the right of the name.)    Note: Assess and validate information in current ACP documents, as indicated. If no Authorized Decision Maker has previously been identified, then patient chooses [place]:  \"Who would you like to name as your primary health care decision-maker? \"    Name: Delvin Lozano   Relationship: Friend   Phone number: [phone]  Will [place] this person be reached easily? \" YES  \"Who would you like to name as your back-up decision maker? \"   Name: Victorina Boyd  Relationship: Brother  Phone number: [phone]  Will [place] this person be reached easily? \" YES    Note: If the relationship of these Decision-Makers to the patient does NOT follow your state's Next of Kin hierarchy, recommend that patient complete ACP document that meets state-specific requirements to allow them to act on the patient's behalf when appropriate. Care Preferences:    Hospitalization: \"If your health worsens and it becomes clear that your chance of recovery is unlikely, what would your preference be regarding hospitalization? \"  The patient would prefer comfort-focused treatment without hospitalization. Ventilation: \"If you were in your present state of health and suddenly became very ill and were unable to breathe on your own, what would your preference be about the use of a ventilator (breathing machine) if it was available to you? \"    Unsure    \"If your health worsens and it becomes clear that your chance of recovery is unlikely, what would your preference be about the use of a ventilator (breathing machine) if it was available to you? \"   no      Resuscitation:  \"CPR works best to restart the heart when there is a sudden event, like a heart attack, in someone who is otherwise healthy. Unfortunately, CPR does not typically restart the heart for people who have serious health conditions or who are very sick. \"    \"In the event your heart stopped as a result of an underlying serious health condition, would you want attempts to be made to restart your heart (answer \"yes\" for attempt to resuscitate) or would you prefer a natural death (answer \"no\" for do not attempt to resuscitate)? \"   No. Do NOT attempt to resuscitate. NOTE: If the patient has a valid advance directive AND provides care preference(s) that are inconsistent with that prior directive, advise the patient to consider either: creating a new advance directive that complies with state-specific requirements; or, if that is not possible, orally revoking that prior directive in accordance with state-specific requirements, which must be documented in the EHR.     Conversation Outcomes / Follow-Up Plan:   ACP in process - Advised to complete ACP and to bring to office for scanning      Length of Voluntary ACP Conversation in minutes:  <16 minutes (Non-Billable)      Philip Holt MD

## 2020-12-24 NOTE — PATIENT INSTRUCTIONS
Medicare Wellness Visit, Male The best way to live healthy is to have a lifestyle where you eat a well-balanced diet, exercise regularly, limit alcohol use, and quit all forms of tobacco/nicotine, if applicable. Regular preventive services are another way to keep healthy. Preventive services (vaccines, screening tests, monitoring & exams) can help personalize your care plan, which helps you manage your own care. Screening tests can find health problems at the earliest stages, when they are easiest to treat. [facility] follows the current, evidence-based guidelines published by the [facility] ([facility]) when recommending preventive services for our patients. Because we follow these guidelines, sometimes recommendations change over time as research supports it. (For example, a prostate screening blood test is no longer routinely recommended for men with no symptoms). Of course, you and your doctor may decide to screen more often for some diseases, based on your risk and co-morbidities (chronic disease you are already diagnosed with). Preventive services for you include: - Medicare offers their members a free annual wellness visit, which is time for you and your primary care provider to discuss and plan for your preventive service needs. Take advantage of this benefit every year! 
-All adults over age 72 should receive the recommended pneumonia vaccines. Current USPSTF guidelines recommend a series of two vaccines for the best pneumonia protection.  
-All adults should have a flu vaccine yearly and tetanus vaccine every 10 years. 
-All adults age 48 and older should receive the shingles vaccines (series of two vaccines). -All adults age 38-68 who are overweight should have a diabetes screening test once every three years. -Other screening tests & preventive services for persons with diabetes include: an eye exam to screen for diabetic retinopathy, a kidney function test, a foot exam, and stricter control over your cholesterol.  
-Cardiovascular screening for adults with routine risk involves an electrocardiogram (ECG) at intervals determined by the provider.  
-Colorectal cancer screening should be done for adults age 54-65 with no increased risk factors for colorectal cancer. There are a number of acceptable methods of screening for this type of cancer. Each test has its own benefits and drawbacks. Discuss with your provider what is most appropriate for you during your annual wellness visit. The different tests include: colonoscopy (considered the best screening method), a fecal occult blood test, a fecal DNA test, and sigmoidoscopy. 
-All adults born between [facility] should be screened once for Hepatitis C. 
-An Abdominal Aortic Aneurysm (AAA) Screening is recommended for men age 73-68 who has ever smoked in their lifetime. Here is a list of your current Health Maintenance items (your personalized list of preventive services) with a due date: 
Health Maintenance Due Topic Date Due  Shingles Vaccine (1 of 2) 04/30/1990  Yearly Flu Vaccine (1) 09/01/2020  Glaucoma Screening   02/12/2021

## 2020-12-24 NOTE — PROGRESS NOTES
1. Have you been to the ER, urgent care clinic since your last visit? Hospitalized since your last visit? No    2. Have you seen or consulted any other health care providers outside of the [address] [place] [address] since your last visit? Include any pap smears or colon screening.  No

## 2020-12-24 NOTE — PROGRESS NOTES
This is the Subsequent Medicare Annual Wellness Exam, performed 12 months or more after the Initial AWV or the last Subsequent AWV    I have reviewed the patient's medical history in detail and updated the computerized patient record. Depression Risk Factor Screening:     3 most recent PHQ Screens 12/24/2020   Little interest or pleasure in doing things Not at all   Feeling down, depressed, irritable, or hopeless Not at all   Total Score PHQ 2 0       Alcohol Risk Screen    Do you average more than 1 drink per night or more than 7 drinks a week: No    In the past three months have you have had more than 4 drinks containing alcohol on one occasion: No        Functional Ability and Level of Safety:    Hearing: Hearing is good. Whisper Test done with normal results. Activities of Daily Living: The home contains: no safety equipment. Patient needs help with:  transportation, shopping and preparing meals      Ambulation: with no difficulty     Fall Risk:  Fall Risk Assessment, last 12 mths 12/24/2020   Able to walk? Yes   Fall in past 12 months? 0   Number of falls in past 12 months -   Fall with injury? -      Abuse Screen:  Patient is not abused       Cognitive Screening    Has your family/caregiver stated any concerns about your memory: yes - Sometimes get confused    Cognitive Screening: Patient declined screening    Assessment/Plan   Education and counseling provided:  Are appropriate based on today's review and evaluation  End-of-Life planning (with patient's consent)  Influenza Vaccine (Advised to get vaccine at local pharmacy)  Screening for glaucoma (Advised on getting eye exam done every 2 years)    Diagnoses and all orders for this visit:    1. Medicare annual wellness visit, subsequent    2.  Advanced directives, counseling/discussion      Health Maintenance Due     Health Maintenance Due   Topic Date Due    Shingrix Vaccine Age 49> (1 of 2) 04/30/1990    Flu Vaccine (1) 09/01/2020    GLAUCOMA SCREENING Q2Y  02/12/2021       Patient Care Team   Patient Care Team:  Keron Carolina MD as PCP - General (Family Medicine)  Keron Carolina MD as PCP - 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 Current Outpatient Medications   Medication Sig Dispense Refill    amLODIPine (NORVASC) 5 mg tablet TAKE ONE TABLET BY MOUTH DAILY 90 Tab 0    senna-docusate (PERICOLACE) 8.6-50 mg per tablet Take 1 Tab by mouth daily. 30 Tab 1    rosuvastatin (CRESTOR) 20 mg tablet Take 1 Tab by mouth nightly. 90 Tab 2    carbidopa-levodopa (SINEMET)  mg per tablet Take 1 Tab by mouth three (3) times daily. 180 Tab 6     No Known Allergies    Family History   Problem Relation Age of Onset    No Known Problems Mother     No Known Problems Father     Diabetes Other     Hypertension Other      Social History     Tobacco Use    Smoking status: Never Smoker    Smokeless tobacco: Never Used   Substance Use Topics    Alcohol use: No       [place], who was evaluated through a synchronous (real-time) audio-video encounter, and/or his healthcare decision maker, is aware that it is a billable service, with coverage as determined by his insurance carrier. He provided verbal consent to proceed: Yes, and patient identification was verified. It was conducted pursuant to the emergency declaration under the [facility], [address] [place] [address] authority and the [address] and [employer] General Act.  A caregiver was present when appropriate. Ability to conduct physical exam was limited. I was in the office. The patient was at home.     Gladis Rooney MD

## 2021-01-06 ENCOUNTER — CLINICAL SUPPORT (OUTPATIENT)
Dept: FAMILY MEDICINE CLINIC | Age: 81
End: 2021-01-06
Payer: MEDICARE

## 2021-01-06 DIAGNOSIS — Z23 NEEDS FLU SHOT: ICD-10-CM

## 2021-01-06 DIAGNOSIS — Z23 ENCOUNTER FOR IMMUNIZATION: Primary | ICD-10-CM

## 2021-01-06 PROCEDURE — 90694 VACC AIIV4 NO PRSRV 0.5ML IM: CPT | Performed by: NURSE PRACTITIONER

## 2021-02-09 ENCOUNTER — OFFICE VISIT (OUTPATIENT)
Dept: FAMILY MEDICINE CLINIC | Age: 81
End: 2021-02-09
Payer: MEDICARE

## 2021-02-09 VITALS
HEIGHT: 68 IN | OXYGEN SATURATION: 96 % | WEIGHT: 130 LBS | TEMPERATURE: 97.3 F | DIASTOLIC BLOOD PRESSURE: 48 MMHG | HEART RATE: 74 BPM | RESPIRATION RATE: 16 BRPM | SYSTOLIC BLOOD PRESSURE: 100 MMHG | BODY MASS INDEX: 19.7 KG/M2

## 2021-02-09 DIAGNOSIS — G20 PARKINSON'S DISEASE (HCC): ICD-10-CM

## 2021-02-09 DIAGNOSIS — E46 PROTEIN-CALORIE MALNUTRITION, UNSPECIFIED SEVERITY (HCC): ICD-10-CM

## 2021-02-09 DIAGNOSIS — R05.9 COUGH: ICD-10-CM

## 2021-02-09 DIAGNOSIS — F03.91 DEMENTIA WITH BEHAVIORAL DISTURBANCE, UNSPECIFIED DEMENTIA TYPE: ICD-10-CM

## 2021-02-09 DIAGNOSIS — I10 ESSENTIAL HYPERTENSION WITH GOAL BLOOD PRESSURE LESS THAN 130/80: Primary | ICD-10-CM

## 2021-02-09 PROCEDURE — G8427 DOCREV CUR MEDS BY ELIG CLIN: HCPCS | Performed by: NURSE PRACTITIONER

## 2021-02-09 PROCEDURE — 1101F PT FALLS ASSESS-DOCD LE1/YR: CPT | Performed by: NURSE PRACTITIONER

## 2021-02-09 PROCEDURE — G8536 NO DOC ELDER MAL SCRN: HCPCS | Performed by: NURSE PRACTITIONER

## 2021-02-09 PROCEDURE — G8754 DIAS BP LESS 90: HCPCS | Performed by: NURSE PRACTITIONER

## 2021-02-09 PROCEDURE — G8420 CALC BMI NORM PARAMETERS: HCPCS | Performed by: NURSE PRACTITIONER

## 2021-02-09 PROCEDURE — 99214 OFFICE O/P EST MOD 30 MIN: CPT | Performed by: NURSE PRACTITIONER

## 2021-02-09 PROCEDURE — G8432 DEP SCR NOT DOC, RNG: HCPCS | Performed by: NURSE PRACTITIONER

## 2021-02-09 PROCEDURE — G0463 HOSPITAL OUTPT CLINIC VISIT: HCPCS | Performed by: NURSE PRACTITIONER

## 2021-02-09 PROCEDURE — G8752 SYS BP LESS 140: HCPCS | Performed by: NURSE PRACTITIONER

## 2021-02-09 RX ORDER — BENZONATATE 200 MG/1
200 CAPSULE ORAL
Qty: 30 CAP | Refills: 0 | Status: SHIPPED
Start: 2021-02-09 | End: 2021-05-10

## 2021-02-09 RX ORDER — AMLODIPINE BESYLATE 5 MG/1
2.5 TABLET ORAL DAILY
Qty: 90 TAB | Refills: 0
Start: 2021-02-09 | End: 2021-03-15 | Stop reason: SDUPTHER

## 2021-02-09 NOTE — PATIENT INSTRUCTIONS
Only give 1/2 tablet of the blood pressure pill. Amlodipine 2.5mg once a day, every day. Come back in one week for blood pressure recheck. If he has any cough, fever, sniffles, cancel and reschedule your appointment. If he gets tired, has a fever, is sweating, cough gets worse, has problem breathing, Call 911.

## 2021-02-09 NOTE — PROGRESS NOTES
Chen Mayfield               [phone]      Porfirio Eye is a [de-identified] y.o. male and presents with Establish Care (Previous Dr. Lucia Lopez pt) and Cough (x 1 day- Wife doesnt want it to get worse)       Assessment/Plan:    Diagnoses and all orders for this visit:    1. Essential hypertension with goal blood pressure less than 130/80  -     amLODIPine (NORVASC) 5 mg tablet; Take 0.5 Tabs by mouth daily. Blood pressure 100/48 manually in the office today, this is notably lower than his last blood pressure done December 2, 2020 of 136/61  Denies any signs and symptoms of lightheadedness or dizziness and the wife reports no abnormalities  We will decrease his amlodipine in half to 2.5 mg a day and have asked him to return in a week for blood pressure recheck    2. Cough  -     benzonatate (TESSALON) 200 mg capsule; Take 1 Cap by mouth three (3) times daily as needed for Cough. He has been having a cough, wife denies fever or febrile symptoms, the wife reports he is eating well and is not sleeping excessively  Informed her of signs and symptoms to monitor for which could indicate he has Covid virus  Have also instructed her if he has a continued cough or worsening symptoms to reschedule his 1 week follow-up to a time when he is not showing any flulike symptoms  She verbalized understanding    3. Parkinson's disease [facility])  Assessment & Plan: This condition is managed by Specialist.      4. Dementia with behavioral disturbance, unspecified dementia type [facility])  Assessment & Plan: This condition is managed by Specialist.      5. Protein-calorie malnutrition, unspecified severity ([address]  Assessment & Plan:  Stable, based on history, physical exam and review of pertinent labs, studies and medications; meds reconciled; continue current treatment plan.       Follow-up and Dispositions    · Return in about 1 week (around 2/16/2021) for nurse visit, b/p check  , AND, 3 months, HTN, DM, 15 min, office only. Subjective:    Hypertension:   Patient reports taking medications as instructed. yes   Medication side effects noted. no  Headache upon wakening. no   Home BP monitoring in range of does not check's systolic. Do you experience chest pain/pressure or SOB with exertion? no  Maintain a low salt diet? yes   B/p 100/48 manually  Key CAD CHF Meds             amLODIPine (NORVASC) 5 mg tablet (Taking) Take 0.5 Tabs by mouth daily. rosuvastatin (CRESTOR) 20 mg tablet (Taking) Take 1 Tab by mouth nightly. Cough  started yesterday  Wife denies noticing sob  He is ambulating strong  Has good appetite  Urinating well, is continent  Lungs clear, no fever      ROS:     ROS  As stated in HPI, otherwise all others negative. The problem list was updated as a part of today's visit. 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    Essential hypertension with goal blood pressure less than 130/80 I10    Parkinson's disease ([facility] G20    Prediabetes R73.03    History of pulmonary embolism Z80.36    Dementia with behavioral disturbance (HCC) F03.91    CKD (chronic kidney disease) stage 3, GFR 30-59 ml/min N18.30    Protein-calorie malnutrition (HCC) E46       The PSH, FH were reviewed. SH:  Social History     Tobacco Use    Smoking status: Never Smoker    Smokeless tobacco: Never Used   Substance Use Topics    Alcohol use: No    Drug use: Never       Medications/Allergies:  Current Outpatient Medications on File Prior to Visit   Medication Sig Dispense Refill    rosuvastatin (CRESTOR) 20 mg tablet Take 1 Tab by mouth nightly. 90 Tab 0    carbidopa-levodopa (Sinemet)  mg per tablet Take 1 Tab by mouth three (3) times daily. 90 Tab 0    senna-docusate (PERICOLACE) 8.6-50 mg per tablet Take 1 Tab by mouth daily. 30 Tab 0     No current facility-administered medications on file prior to visit.          Allergies   Allergen Reactions    Lisinopril Unknown (comments)       Objective:  Visit Vitals  BP (!) 100/48   Pulse 74   Temp 97.3 °F (36.3 °C) (Oral)   Resp 16   Ht 5' 8\" (1.727 m)   Wt 130 lb (59 kg)   SpO2 96%   BMI 19.77 kg/m²    Body mass index is 19.77 kg/m². Physical assessment  Physical Exam  Vitals signs and nursing note reviewed. Constitutional:       General: He is awake. Comments: Minimal verbal interaction   Eyes:      Conjunctiva/sclera: Conjunctivae normal.      Pupils: Pupils are equal, round, and reactive to light. Neck:      Musculoskeletal: Normal range of motion. Vascular: No JVD. Cardiovascular:      Rate and Rhythm: Normal rate and regular rhythm. Heart sounds: Normal heart sounds. No murmur. No friction rub. No gallop. Pulmonary:      Effort: Pulmonary effort is normal.      Breath sounds: Normal breath sounds. Musculoskeletal: Normal range of motion. Comments: Slow steady wide based gait   Skin:     General: Skin is warm and dry. Neurological:      Mental Status: He is alert and oriented to person, place, and time. Psychiatric:         Mood and Affect: Affect normal.         Behavior: Behavior is cooperative.          Cognition and Memory: Memory normal.         Judgment: Judgment normal.           Labwork and Ancillary Studies:    CBC w/Diff  Lab Results   Component Value Date/Time    WBC 3.0 (L) 08/04/2018 12:48 PM    HGB 14.0 08/04/2018 12:48 PM    PLATELET 042 (L) 24/42/3580 12:48 PM         Basic Metabolic Profile  Lab Results   Component Value Date/Time    Sodium 138 10/26/2020 02:56 PM    Potassium 3.9 10/26/2020 02:56 PM    Chloride 101 10/26/2020 02:56 PM    CO2 29 10/26/2020 02:56 PM    Anion gap 8 10/26/2020 02:56 PM    Glucose 275 (H) 10/26/2020 02:56 PM    BUN 18 10/26/2020 02:56 PM    Creatinine 1.74 (H) 10/26/2020 02:56 PM    BUN/Creatinine ratio 10 (L) 10/26/2020 02:56 PM    GFR est AA 46 (L) 10/26/2020 02:56 PM    GFR est non-AA 38 (L) 10/26/2020 02:56 PM    Calcium 9.5 10/26/2020 02:56 PM        Cholesterol  Lab Results   Component Value Date/Time    Cholesterol, total 134 10/26/2020 02:56 PM    HDL Cholesterol 78 (H) 10/26/2020 02:56 PM    LDL, calculated 46.4 10/26/2020 02:56 PM    Triglyceride 48 10/26/2020 02:56 PM    CHOL/HDL Ratio 1.7 10/26/2020 02:56 PM       Health Maintenance:   Health Maintenance   Topic Date Due   • COVID-19 Vaccine (1 of 2) 04/30/1956   • Shingrix Vaccine Age 50> (1 of 2) 04/30/1990   • GLAUCOMA SCREENING Q2Y  02/12/2021   • Lipid Screen  10/26/2021   • Medicare Yearly Exam  12/25/2021   • DTaP/Tdap/Td series (2 - Td) 08/04/2028   • Flu Vaccine  Completed   • Pneumococcal 65+ years  Completed       I have discussed the diagnosis with the patient and the intended plan as seen in the above orders.  The patient has received an After-Visit Summary and questions were answered concerning future plans.     An After Visit Summary was printed and given to the patient.    All diagnosis have been discussed with the patient and all of the patient's questions have been answered.     Follow-up and Dispositions    · Return in about 1 week (around 2/16/2021) for nurse visit, b/p check  , AND, 3 months, HTN, DM, 15 min, office only.           Soila Carpenter, MARTIN-BC  [facility]  [address] [address]

## 2021-02-09 NOTE — PROGRESS NOTES
Chief Complaint   Patient presents with   Vijay Love pt     Chief Complaint   Patient presents with   [facility].     Previous Dr. Alexander Love pt    Cough     x 1 day- Wife doesnt want it to get worse       1. Have you been to the ER, urgent care clinic since your last visit? Hospitalized since your last visit? No    2. Have you seen or consulted any other health care providers outside of the [address] [place] [address] since your last visit? Include any pap smears or colon screening. Chief Complaint   Patient presents with   [facility].     Previous Dr. Alexander Love pt    Cough     x 1 day- Wife doesnt want it to get worse       3 most recent PHQ Screens 12/24/2020   Little interest or pleasure in doing things Not at all   Feeling down, depressed, irritable, or hopeless Not at all   Total Score PHQ 2 0     Fall Risk Assessment, last 12 mths 2/9/2021   Able to walk? Yes   Fall in past 12 months? 0   Do you feel unsteady? 0   Are you worried about falling 0   Number of falls in past 12 months -   Fall with injury?  -         Visit Vitals  BP (!) 100/49   Pulse 74   Temp 97.3 °F (36.3 °C) (Oral)   Resp 16   Ht 5' 8\" (1.727 m)   Wt 130 lb (59 kg)   SpO2 96%   BMI 19.77 kg/m²

## 2021-02-14 PROBLEM — E46 PROTEIN-CALORIE MALNUTRITION (HCC): Status: ACTIVE | Noted: 2021-02-14

## 2021-03-15 DIAGNOSIS — I10 ESSENTIAL HYPERTENSION WITH GOAL BLOOD PRESSURE LESS THAN 130/80: ICD-10-CM

## 2021-03-15 RX ORDER — AMLODIPINE BESYLATE 5 MG/1
2.5 TABLET ORAL DAILY
Qty: 90 TAB | Refills: 0 | Status: SHIPPED | OUTPATIENT
Start: 2021-03-15 | End: 2021-05-10

## 2021-05-10 ENCOUNTER — OFFICE VISIT (OUTPATIENT)
Dept: FAMILY MEDICINE CLINIC | Age: 81
End: 2021-05-10
Payer: MEDICARE

## 2021-05-10 ENCOUNTER — HOSPITAL ENCOUNTER (OUTPATIENT)
Dept: LAB | Age: 81
Discharge: HOME | End: 2021-05-10
Payer: MEDICARE

## 2021-05-10 VITALS
HEART RATE: 78 BPM | DIASTOLIC BLOOD PRESSURE: 54 MMHG | OXYGEN SATURATION: 98 % | HEIGHT: 68 IN | TEMPERATURE: 97.6 F | SYSTOLIC BLOOD PRESSURE: 133 MMHG | WEIGHT: 130 LBS | RESPIRATION RATE: 16 BRPM | BODY MASS INDEX: 19.7 KG/M2

## 2021-05-10 DIAGNOSIS — E78.41 ELEVATED LIPOPROTEIN(A): ICD-10-CM

## 2021-05-10 DIAGNOSIS — G20 PARKINSON'S DISEASE (HCC): ICD-10-CM

## 2021-05-10 DIAGNOSIS — I10 ESSENTIAL HYPERTENSION WITH GOAL BLOOD PRESSURE LESS THAN 130/80: Primary | ICD-10-CM

## 2021-05-10 DIAGNOSIS — I10 ESSENTIAL HYPERTENSION WITH GOAL BLOOD PRESSURE LESS THAN 130/80: ICD-10-CM

## 2021-05-10 LAB
ALBUMIN SERPL-MCNC: 3.9 G/DL (ref 3.4–5)
ALBUMIN/GLOB SERPL: 0.9 {RATIO} (ref 0.8–1.7)
ALP SERPL-CCNC: 84 U/L (ref 45–117)
ALT SERPL-CCNC: 17 U/L (ref 16–61)
ANION GAP SERPL CALC-SCNC: 5 MMOL/L (ref 3–18)
AST SERPL-CCNC: 36 U/L (ref 10–38)
BILIRUB SERPL-MCNC: 0.5 MG/DL (ref 0.2–1)
BUN SERPL-MCNC: 18 MG/DL (ref 7–18)
BUN/CREAT SERPL: 13 (ref 12–20)
CALCIUM SERPL-MCNC: 9 MG/DL (ref 8.5–10.1)
CHLORIDE SERPL-SCNC: 103 MMOL/L (ref 100–111)
CHOLEST SERPL-MCNC: 132 MG/DL
CO2 SERPL-SCNC: 32 MMOL/L (ref 21–32)
CREAT SERPL-MCNC: 1.41 MG/DL (ref 0.6–1.3)
GLOBULIN SER CALC-MCNC: 4.4 G/DL (ref 2–4)
GLUCOSE SERPL-MCNC: 98 MG/DL (ref 74–99)
HDLC SERPL-MCNC: 77 MG/DL (ref 40–60)
HDLC SERPL: 1.7 {RATIO} (ref 0–5)
LDLC SERPL CALC-MCNC: 49.4 MG/DL (ref 0–100)
LIPID PROFILE,FLP: ABNORMAL
POTASSIUM SERPL-SCNC: 4.5 MMOL/L (ref 3.5–5.5)
PROT SERPL-MCNC: 8.3 G/DL (ref 6.4–8.2)
SODIUM SERPL-SCNC: 140 MMOL/L (ref 136–145)
TRIGL SERPL-MCNC: 28 MG/DL (ref ?–150)
VLDLC SERPL CALC-MCNC: 5.6 MG/DL

## 2021-05-10 PROCEDURE — G8536 NO DOC ELDER MAL SCRN: HCPCS | Performed by: NURSE PRACTITIONER

## 2021-05-10 PROCEDURE — 36415 COLL VENOUS BLD VENIPUNCTURE: CPT

## 2021-05-10 PROCEDURE — 99214 OFFICE O/P EST MOD 30 MIN: CPT | Performed by: NURSE PRACTITIONER

## 2021-05-10 PROCEDURE — G8427 DOCREV CUR MEDS BY ELIG CLIN: HCPCS | Performed by: NURSE PRACTITIONER

## 2021-05-10 PROCEDURE — 80061 LIPID PANEL: CPT

## 2021-05-10 PROCEDURE — 1101F PT FALLS ASSESS-DOCD LE1/YR: CPT | Performed by: NURSE PRACTITIONER

## 2021-05-10 PROCEDURE — G8432 DEP SCR NOT DOC, RNG: HCPCS | Performed by: NURSE PRACTITIONER

## 2021-05-10 PROCEDURE — G8752 SYS BP LESS 140: HCPCS | Performed by: NURSE PRACTITIONER

## 2021-05-10 PROCEDURE — 80053 COMPREHEN METABOLIC PANEL: CPT

## 2021-05-10 PROCEDURE — G8420 CALC BMI NORM PARAMETERS: HCPCS | Performed by: NURSE PRACTITIONER

## 2021-05-10 PROCEDURE — G8754 DIAS BP LESS 90: HCPCS | Performed by: NURSE PRACTITIONER

## 2021-05-10 RX ORDER — AMLODIPINE BESYLATE 5 MG/1
5 TABLET ORAL DAILY
Qty: 90 TAB | Refills: 0
Start: 2021-05-10 | End: 2021-05-17

## 2021-05-10 NOTE — PROGRESS NOTES
No chief complaint on file. Pt preferred language for health care discussion is english. Is someone accompanying this pt? Yes friend    Is the patient using any DME equipment during [address]? no    Depression Screening:  3 most recent [facility] Screens 2/9/2021 12/24/2020 12/2/2020 12/11/2019 10/8/2019 12/5/2018   Little interest or pleasure in doing things Not at all Not at all Not at all Several days Not at all Not at all   Feeling down, depressed, irritable, or hopeless Not at all Not at all Several days Not at all Not at all Not at all   Total Score PHQ 2 0 0 1 1 0 0       Learning Assessment:  Learning Assessment 2/9/2021 12/11/2019   PRIMARY LEARNER Patient Friend   HIGHEST LEVEL OF EDUCATION - PRIMARY LEARNER  GRADUATED HIGH SCHOOL OR GED GRADUATED HIGH SCHOOL OR GED   BARRIERS PRIMARY LEARNER [address] CAREGIVER No Yes   PRIMARY LANGUAGE ENGLISH ENGLISH   LEARNER PREFERENCE PRIMARY LISTENING READING   ANSWERED BY Meli Monroe   RELATIONSHIP SELF OTHER       Abuse Screening:  Abuse Screening Questionnaire 2/9/2021   Do you ever feel afraid of your partner? N   Are you in a relationship with someone who physically or mentally threatens you? N   Is it safe for you to go home? Y       Fall Risk  Fall Risk Assessment, last 12 mths 2/9/2021   Able to walk? Yes   Fall in past 12 months? 0   Do you feel unsteady? 0   Are you worried about falling 0   Number of falls in past 12 months -   Fall with injury? -           Advance Directive:  1. Do you have an advance directive in place? Patient Reply:no    2. If not, would you like material regarding how to put one in place? Patient Reply: no    2. Per patient no changes to their ACP contact no. Coordination of Care:  1. Have you been to the ER, urgent care clinic since your last visit? Hospitalized since your last visit? no    2. Have you seen or consulted any other health care providers outside of the [address] [place] [address] since your last visit? Include any pap smears or colon screening.  no    Provider prefers to do his own med reconciliation

## 2021-05-10 NOTE — PROGRESS NOTES
[address] [place] [address]               [phone]      Ashwini Zelaya is a 80 y.o. male and presents with No chief complaint on file. Assessment/Plan:    Diagnoses and all orders for this visit:    1. Essential hypertension with goal blood pressure less than 191/43  -     METABOLIC PANEL, COMPREHENSIVE; Future  Endorses medication compliance, Follow-up labs today, Blood pressure stable, continue same medications and However blood pressure stable today on the 5 mg dose will continue the same his wife reports she forgot the amlodipine was decreased to 2.5 mg therefore she has been giving him 5 mg  2. Elevated lipoprotein(a)  -     LIPID PANEL; Future  Endorses medication compliance, Follow-up labs today and Denies abdominal pain or symptoms of myalgia  3. Parkinson's disease ([facility]  This is managed by neurology, I reminded her to make follow-up appointment as she missed the follow-up appointment that was due in March      Follow-up and Dispositions    · Return in about 3 months (around 8/10/2021) for HTN, HLD, office only, 15 min. Subjective:    Parkinsons  Managed by neurlogy  Last visit was October  She missed follow up in march, she did not know about the appointment      Hypertension: last visit b/p was low, decreeaed amlodipin to 2.5, was to return in wwek for b/p check, she forgot, she increased the amlodipin back to 5mg   Patient reports taking medications as instructed. yes   Medication side effects noted. no  Headache upon wakening. no   Home BP monitoring in range of does not remember's systolic. Do you experience chest pain/pressure or SOB with exertion? no  Maintain a low salt diet? yes  Key CAD CHF Meds             amLODIPine (NORVASC) 5 mg tablet (Taking/Discontinued) Take 1 Tab by mouth daily.     rosuvastatin (CRESTOR) 20 mg tablet (Taking) TAKE ONE TABLET BY MOUTH ONCE NIGHTLY        HLD:  Has been compliant with meds  all of the time  Compliant with low-fat diet. most of the time    Denies myalgias or other side effects. yes  Cholesterol, total   Date Value Ref Range Status   05/10/2021 132 <200 MG/DL Final     Triglyceride   Date Value Ref Range Status   05/10/2021 28 <150 MG/DL Final     Comment:     The drugs N-acetylcysteine (NAC) and  Metamiszole have been found to cause falsely  low results in this chemical assay. Please  be sure to submit blood samples obtained  BEFORE administration of either of these  drugs to assure correct results. HDL Cholesterol   Date Value Ref Range Status   05/10/2021 77 (H) 40 - 60 MG/DL Final     LDL, calculated   Date Value Ref Range Status   05/10/2021 49.4 0 - 100 MG/DL Final   ]  Key Antihyperlipidemia Meds             rosuvastatin (CRESTOR) 20 mg tablet (Taking) TAKE ONE TABLET BY MOUTH ONCE NIGHTLY           ROS:     ROS  As stated in HPI, otherwise all others negative. The problem list was updated as a part of today's visit. 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    Essential hypertension with goal blood pressure less than 130/80 I10    Parkinson's disease ([facility] G20    Prediabetes R73.03    History of pulmonary embolism Z80.36    Dementia with behavioral disturbance (HCC) F03.91    CKD (chronic kidney disease) stage 3, GFR 30-59 ml/min (HCC) N18.30    Protein-calorie malnutrition (HCC) E46       The PSH, FH were reviewed.       SH:  Social History     Tobacco Use    Smoking status: Never Smoker    Smokeless tobacco: Never Used   Vaping Use    Vaping Use: Never used   Substance Use Topics    Alcohol use: No    Drug use: Never       Medications/Allergies:  Current Outpatient Medications on File Prior to Visit   Medication Sig Dispense Refill    rosuvastatin (CRESTOR) 20 mg tablet TAKE ONE TABLET BY MOUTH ONCE NIGHTLY 90 Tab 1    Senexon-S 8.6-50 mg per tablet TAKE ONE TABLET BY MOUTH DAILY 90 Tab 0     No current facility-administered medications on file prior to visit. Allergies   Allergen Reactions    Lisinopril Unknown (comments)       Objective:  Visit Vitals  BP (!) 133/54 (BP 1 Location: Left upper arm, BP Patient Position: Sitting, BP Cuff Size: Adult)   Pulse 78   Temp 97.6 °F (36.4 °C) (Temporal)   Resp 16   Ht 5' 8\" (1.727 m)   Wt 130 lb (59 kg)   SpO2 98%   BMI 19.77 kg/m²    Body mass index is 19.77 kg/m². Physical assessment  Physical Exam  Vitals and nursing note reviewed. Constitutional:       General: He is awake. Comments: Minimal verbal interaction   Eyes:      Conjunctiva/sclera: Conjunctivae normal.      Pupils: Pupils are equal, round, and reactive to light. Neck:      Vascular: No JVD. Cardiovascular:      Rate and Rhythm: Normal rate and regular rhythm. Heart sounds: Normal heart sounds. No murmur heard. No friction rub. No gallop. Pulmonary:      Effort: Pulmonary effort is normal.      Breath sounds: Normal breath sounds. Musculoskeletal:         General: Normal range of motion. Cervical back: Normal range of motion. Comments: Slow steady wide based gait   Skin:     General: Skin is warm and dry. Neurological:      Mental Status: He is alert and oriented to person, place, and time. Psychiatric:         Mood and Affect: Affect normal.         Behavior: Behavior is cooperative.          Cognition and Memory: Memory normal.         Judgment: Judgment normal.           Labwork and Ancillary Studies:    CBC w/Diff  Lab Results   Component Value Date/Time    WBC 3.0 (L) 08/04/2018 12:48 PM    HGB 14.0 08/04/2018 12:48 PM    PLATELET 599 (L) 32/39/7095 12:48 PM         Basic Metabolic Profile  Lab Results   Component Value Date/Time    Sodium 140 05/10/2021 01:09 PM    Potassium 4.5 05/10/2021 01:09 PM    Chloride 103 05/10/2021 01:09 PM    CO2 32 05/10/2021 01:09 PM    Anion gap 5 05/10/2021 01:09 PM    Glucose 98 05/10/2021 01:09 PM    BUN 18 05/10/2021 01:09 PM    Creatinine 1.41 (H) 05/10/2021 01:09 PM    BUN/Creatinine ratio 13 05/10/2021 01:09 PM    GFR est AA 58 (L) 05/10/2021 01:09 PM    GFR est non-AA 48 (L) 05/10/2021 01:09 PM    Calcium 9.0 05/10/2021 01:09 PM        Cholesterol  Lab Results   Component Value Date/Time    Cholesterol, total 132 05/10/2021 01:09 PM    HDL Cholesterol 77 (H) 05/10/2021 01:09 PM    LDL, calculated 49.4 05/10/2021 01:09 PM    Triglyceride 28 05/10/2021 01:09 PM    CHOL/HDL Ratio 1.7 05/10/2021 01:09 PM       Health Maintenance:   Health Maintenance   Topic Date Due    COVID-19 Vaccine (1) Never done    Shingrix Vaccine Age 50> (1 of 2) Never done    Medicare Yearly Exam  12/25/2021    Lipid Screen  05/10/2022    DTaP/Tdap/Td series (2 - Td) 08/04/2028    Flu Vaccine  Completed    Pneumococcal 65+ years  Completed       I have discussed the diagnosis with the patient and the intended plan as seen in the above orders. The patient has received an After-Visit Summary and questions were answered concerning future plans. An After Visit Summary was printed and given to the patient. All diagnosis have been discussed with the patient and all of the patient's questions have been answered. Follow-up and Dispositions    · Return in about 3 months (around 8/10/2021) for HTN, HLD, office only, 15 min. Raquel Pump, AGNP-BC  [address] [facility] [address]  [address]

## 2021-05-10 NOTE — PATIENT INSTRUCTIONS
Call Dr. Georgiana Jackson to make an appointment for his parkinsons Wayne Palma, [place]   
[address]  
[place], [address] [place] [address]  
[phone]

## 2021-05-17 DIAGNOSIS — I10 ESSENTIAL HYPERTENSION WITH GOAL BLOOD PRESSURE LESS THAN 130/80: ICD-10-CM

## 2021-05-17 RX ORDER — AMLODIPINE BESYLATE 5 MG/1
TABLET ORAL
Qty: 90 TAB | Refills: 0 | Status: SHIPPED | OUTPATIENT
Start: 2021-05-17 | End: 2021-08-11

## 2021-08-11 ENCOUNTER — OFFICE VISIT (OUTPATIENT)
Dept: FAMILY MEDICINE CLINIC | Age: 81
End: 2021-08-11
Payer: MEDICARE

## 2021-08-11 VITALS
OXYGEN SATURATION: 95 % | HEART RATE: 78 BPM | RESPIRATION RATE: 16 BRPM | BODY MASS INDEX: 19.4 KG/M2 | DIASTOLIC BLOOD PRESSURE: 55 MMHG | WEIGHT: 128 LBS | SYSTOLIC BLOOD PRESSURE: 110 MMHG | TEMPERATURE: 97.8 F | HEIGHT: 68 IN

## 2021-08-11 DIAGNOSIS — D72.810 LYMPHOPENIA: ICD-10-CM

## 2021-08-11 DIAGNOSIS — I10 ESSENTIAL HYPERTENSION WITH GOAL BLOOD PRESSURE LESS THAN 130/80: Primary | ICD-10-CM

## 2021-08-11 DIAGNOSIS — G20 PARKINSON'S DISEASE (HCC): ICD-10-CM

## 2021-08-11 DIAGNOSIS — R73.03 PREDIABETES: ICD-10-CM

## 2021-08-11 DIAGNOSIS — E78.2 MIXED HYPERLIPIDEMIA: ICD-10-CM

## 2021-08-11 DIAGNOSIS — F03.91 DEMENTIA WITH BEHAVIORAL DISTURBANCE, UNSPECIFIED DEMENTIA TYPE: ICD-10-CM

## 2021-08-11 DIAGNOSIS — K59.00 CONSTIPATION, UNSPECIFIED CONSTIPATION TYPE: ICD-10-CM

## 2021-08-11 DIAGNOSIS — N18.31 STAGE 3A CHRONIC KIDNEY DISEASE (HCC): ICD-10-CM

## 2021-08-11 DIAGNOSIS — D69.6 THROMBOCYTOPENIA (HCC): ICD-10-CM

## 2021-08-11 PROBLEM — R26.89 NEURODEGENERATIVE GAIT DISORDER: Status: ACTIVE | Noted: 2021-05-25

## 2021-08-11 PROCEDURE — G8536 NO DOC ELDER MAL SCRN: HCPCS | Performed by: NURSE PRACTITIONER

## 2021-08-11 PROCEDURE — 99214 OFFICE O/P EST MOD 30 MIN: CPT | Performed by: NURSE PRACTITIONER

## 2021-08-11 PROCEDURE — G8432 DEP SCR NOT DOC, RNG: HCPCS | Performed by: NURSE PRACTITIONER

## 2021-08-11 PROCEDURE — G8427 DOCREV CUR MEDS BY ELIG CLIN: HCPCS | Performed by: NURSE PRACTITIONER

## 2021-08-11 PROCEDURE — G8752 SYS BP LESS 140: HCPCS | Performed by: NURSE PRACTITIONER

## 2021-08-11 PROCEDURE — 1101F PT FALLS ASSESS-DOCD LE1/YR: CPT | Performed by: NURSE PRACTITIONER

## 2021-08-11 PROCEDURE — G8420 CALC BMI NORM PARAMETERS: HCPCS | Performed by: NURSE PRACTITIONER

## 2021-08-11 PROCEDURE — G8754 DIAS BP LESS 90: HCPCS | Performed by: NURSE PRACTITIONER

## 2021-08-11 RX ORDER — AMLODIPINE BESYLATE 5 MG/1
5 TABLET ORAL DAILY
Qty: 90 TABLET | Refills: 0 | Status: SHIPPED | OUTPATIENT
Start: 2021-08-11 | End: 2021-09-27

## 2021-08-11 RX ORDER — AMOXICILLIN 250 MG
CAPSULE ORAL
Qty: 90 TABLET | Refills: 3 | Status: SHIPPED | OUTPATIENT
Start: 2021-08-11

## 2021-08-11 NOTE — PROGRESS NOTES
1. Have you been to the ER, urgent care clinic since your last visit? Hospitalized since your last visit? No    2. Have you seen or consulted any other health care providers outside of the [address] [place] [address] since your last visit? Include any pap smears or colon screening.  No     Health Maintenance Due   Topic Date Due    COVID-19 Vaccine (1) Never done    Shingrix Vaccine Age 50> (1 of 2) Never done

## 2021-08-11 NOTE — PROGRESS NOTES
[address] [place] [address]               [phone]      Renea Laureano is a 80 y.o. male and presents with Follow Up Chronic Condition (3 month; HTN; HLD)       Assessment/Plan:    Diagnoses and all orders for this visit:    1. Essential hypertension with goal blood pressure less than 130/80  -     amLODIPine (NORVASC) 5 mg tablet; Take 1 Tablet by mouth daily.  -     METABOLIC PANEL, COMPREHENSIVE; Future  Endorses medication compliance, Refill provided, Have asked him/her to come in for follow-up labs and Blood pressure stable, continue same medications   2. Mixed hyperlipidemia  -     LIPID PANEL; Future  Endorses medication compliance, Have asked him/her to come in for follow-up labs and Denies abdominal pain or symptoms of myalgia   3. Prediabetes  -     HEMOGLOBIN A1C WITH EAG; Future  Have asked him/her to come in for follow-up labs   4. Constipation, unspecified constipation type  -     senna-docusate (Senexon-S) 8.6-50 mg per tablet; TAKE ONE TABLET BY MOUTH DAILY  Refill provided   5. Thrombocytopenia (HCC)  -     CBC WITH AUTOMATED DIFF; Future  Have asked him/her to come in for follow-up labs   6. Lymphopenia  -     CBC WITH AUTOMATED DIFF; Future    7. Stage 3a chronic kidney disease (HCC)  -     METABOLIC PANEL, COMPREHENSIVE; Future  Have asked him/her to come in for follow-up labs   8. Parkinson's disease ([facility]  information for the visit today came from wife, his dementia and parkinsons is managed by Dr. Amita Caruso, neurology   9. Dementia with behavioral disturbance, unspecified dementia type ([facility]        Follow-up and Dispositions    · Return in about 4 months (around 12/11/2021) for MAWV, HTN, HLD, 30 min, office only, AND, labs prior.          Subjective:    Parkinsons  Last neurology visit was about 2-3 months ago  He is seen by Dr. Amita Caruso  Neurologist encourages increased exercises  He goes walking daily    Hypertension:   Patient reports taking medications as instructed. yes   Medication side effects noted. no  Headache upon wakening. no   Home BP monitoring in range of does not check    Do you experience chest pain/pressure or SOB with exertion? no  Maintain a low salt diet? yes  Key CAD CHF Meds             amLODIPine (NORVASC) 5 mg tablet (Taking) Take 1 Tablet by mouth daily. rosuvastatin (CRESTOR) 20 mg tablet (Taking) TAKE ONE TABLET BY MOUTH ONCE NIGHTLY        HLD:  Has been compliant with meds  all of the time  Compliant with low-fat diet. most of the time    Denies myalgias or other side effects. yes  Cholesterol, total   Date Value Ref Range Status   05/10/2021 132 <200 MG/DL Final     Triglyceride   Date Value Ref Range Status   05/10/2021 28 <150 MG/DL Final     Comment:     The drugs N-acetylcysteine (NAC) and  Metamiszole have been found to cause falsely  low results in this chemical assay. Please  be sure to submit blood samples obtained  BEFORE administration of either of these  drugs to assure correct results. HDL Cholesterol   Date Value Ref Range Status   05/10/2021 77 (H) 40 - 60 MG/DL Final     LDL, calculated   Date Value Ref Range Status   05/10/2021 49.4 0 - 100 MG/DL Final   ]  Key Antihyperlipidemia Meds             rosuvastatin (CRESTOR) 20 mg tablet (Taking) TAKE ONE TABLET BY MOUTH ONCE NIGHTLY           ROS:     ROS  As stated in HPI, otherwise all others negative. The problem list was updated as a part of today's visit.   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 SH:  Social History     Tobacco Use    Smoking status: Never Smoker    Smokeless tobacco: Never Used   Vaping Use    Vaping Use: Never used   Substance Use Topics    Alcohol use: No    Drug use: Never       Medications/Allergies:  Current Outpatient Medications on File Prior to Visit   Medication Sig Dispense Refill    carbidopa-levodopa (SINEMET)  mg per tablet TAKE ONE TABLET BY MOUTH THREE TIMES A  Tab 3    rosuvastatin (CRESTOR) 20 mg tablet TAKE ONE TABLET BY MOUTH ONCE NIGHTLY 90 Tab 1     No current facility-administered medications on file prior to visit. Allergies   Allergen Reactions    Lisinopril Unknown (comments)       Objective:  Visit Vitals  BP (!) 110/55   Pulse 78   Temp 97.8 °F (36.6 °C) (Oral)   Resp 16   Ht 5' 8\" (1.727 m)   Wt 128 lb (58.1 kg)   SpO2 95%   BMI 19.46 kg/m²    Body mass index is 19.46 kg/m². Physical assessment  Physical Exam  Vitals and nursing note reviewed. Constitutional:       Comments: Most of today's information came from the patients wife, he does not make eye contact, very little verbal communication, answers in one to two words   Eyes:      Conjunctiva/sclera: Conjunctivae normal.      Pupils: Pupils are equal, round, and reactive to light. Neck:      Vascular: No JVD. Cardiovascular:      Rate and Rhythm: Normal rate and regular rhythm. Heart sounds: Normal heart sounds. No murmur heard. No friction rub. No gallop. Pulmonary:      Effort: Pulmonary effort is normal.      Breath sounds: Normal breath sounds. Musculoskeletal:         General: Normal range of motion. Cervical back: Normal range of motion. Comments: Slow, strong, ataxic gait   Skin:     General: Skin is warm and dry. Neurological:      Mental Status: He is alert and oriented to person, place, and time.    Psychiatric:         Mood and Affect: Affect normal.         Cognition and Memory: Memory normal. Judgment: Judgment normal.           Labwork and Ancillary Studies:    CBC w/Diff  Lab Results   Component Value Date/Time    WBC 3.0 (L) 08/04/2018 12:48 PM    HGB 14.0 08/04/2018 12:48 PM    PLATELET 124 (L) 87/66/3519 12:48 PM         Basic Metabolic Profile  Lab Results   Component Value Date/Time    Sodium 140 05/10/2021 01:09 PM    Potassium 4.5 05/10/2021 01:09 PM    Chloride 103 05/10/2021 01:09 PM    CO2 32 05/10/2021 01:09 PM    Anion gap 5 05/10/2021 01:09 PM    Glucose 98 05/10/2021 01:09 PM    BUN 18 05/10/2021 01:09 PM    Creatinine 1.41 (H) 05/10/2021 01:09 PM    BUN/Creatinine ratio 13 05/10/2021 01:09 PM    GFR est AA 58 (L) 05/10/2021 01:09 PM    GFR est non-AA 48 (L) 05/10/2021 01:09 PM    Calcium 9.0 05/10/2021 01:09 PM        Cholesterol  Lab Results   Component Value Date/Time    Cholesterol, total 132 05/10/2021 01:09 PM    HDL Cholesterol 77 (H) 05/10/2021 01:09 PM    LDL, calculated 49.4 05/10/2021 01:09 PM    Triglyceride 28 05/10/2021 01:09 PM    CHOL/HDL Ratio 1.7 05/10/2021 01:09 PM       Health Maintenance:   Health Maintenance   Topic Date Due    COVID-19 Vaccine (1) Never done    Shingrix Vaccine Age 50> (1 of 2) Never done    Flu Vaccine (1) 09/01/2021    Medicare Yearly Exam  12/25/2021    Lipid Screen  05/10/2022    DTaP/Tdap/Td series (2 - Td or Tdap) 08/04/2028    Pneumococcal 65+ years  Completed       I have discussed the diagnosis with the patient and the intended plan as seen in the above orders. The patient has received an After-Visit Summary and questions were answered concerning future plans. An After Visit Summary was printed and given to the patient. All diagnosis have been discussed with the patient and all of the patient's questions have been answered. Follow-up and Dispositions    · Return in about 4 months (around 12/11/2021) for [address] [place] [address], HTN, HLD, 30 min, office only, AND, labs prior.            Boom Krishnamurthy, [facility] [address] [id number] [facility] [address]  [id number]

## 2021-08-24 PROBLEM — K59.00 CONSTIPATION: Status: ACTIVE | Noted: 2021-08-24

## 2021-09-25 DIAGNOSIS — I10 ESSENTIAL HYPERTENSION WITH GOAL BLOOD PRESSURE LESS THAN 130/80: ICD-10-CM

## 2021-09-27 RX ORDER — AMLODIPINE BESYLATE 5 MG/1
5 TABLET ORAL DAILY
Qty: 90 TABLET | Refills: 0 | Status: SHIPPED | OUTPATIENT
Start: 2021-09-27 | End: 2022-03-09

## 2021-10-29 DIAGNOSIS — E78.41 ELEVATED LIPOPROTEIN(A): ICD-10-CM

## 2021-11-01 RX ORDER — ROSUVASTATIN CALCIUM 20 MG/1
TABLET, COATED ORAL
Qty: 90 TABLET | Refills: 1 | Status: SHIPPED | OUTPATIENT
Start: 2021-11-01 | End: 2022-05-01

## 2021-12-09 ENCOUNTER — APPOINTMENT (OUTPATIENT)
Dept: FAMILY MEDICINE CLINIC | Age: 81
End: 2021-12-09

## 2021-12-09 ENCOUNTER — HOSPITAL ENCOUNTER (OUTPATIENT)
Dept: LAB | Age: 81
Discharge: HOME | End: 2021-12-09
Payer: MEDICARE

## 2021-12-09 DIAGNOSIS — R73.03 PREDIABETES: ICD-10-CM

## 2021-12-09 DIAGNOSIS — D69.6 THROMBOCYTOPENIA (HCC): ICD-10-CM

## 2021-12-09 DIAGNOSIS — I10 ESSENTIAL HYPERTENSION WITH GOAL BLOOD PRESSURE LESS THAN 130/80: ICD-10-CM

## 2021-12-09 DIAGNOSIS — N18.31 STAGE 3A CHRONIC KIDNEY DISEASE (HCC): ICD-10-CM

## 2021-12-09 DIAGNOSIS — E78.2 MIXED HYPERLIPIDEMIA: ICD-10-CM

## 2021-12-09 DIAGNOSIS — D72.810 LYMPHOPENIA: ICD-10-CM

## 2021-12-09 LAB
ALBUMIN SERPL-MCNC: 4.2 G/DL (ref 3.4–5)
ALBUMIN/GLOB SERPL: 1 {RATIO} (ref 0.8–1.7)
ALP SERPL-CCNC: 81 U/L (ref 45–117)
ALT SERPL-CCNC: 43 U/L (ref 16–61)
ANION GAP SERPL CALC-SCNC: 5 MMOL/L (ref 3–18)
AST SERPL-CCNC: 57 U/L (ref 10–38)
BASOPHILS # BLD: 0 K/UL (ref 0–0.1)
BASOPHILS NFR BLD: 1 % (ref 0–2)
BILIRUB SERPL-MCNC: 0.8 MG/DL (ref 0.2–1)
BUN SERPL-MCNC: 14 MG/DL (ref 7–18)
BUN/CREAT SERPL: 10 (ref 12–20)
CALCIUM SERPL-MCNC: 8.8 MG/DL (ref 8.5–10.1)
CHLORIDE SERPL-SCNC: 103 MMOL/L (ref 100–111)
CHOLEST SERPL-MCNC: 119 MG/DL
CO2 SERPL-SCNC: 31 MMOL/L (ref 21–32)
CREAT SERPL-MCNC: 1.45 MG/DL (ref 0.6–1.3)
DIFFERENTIAL METHOD BLD: ABNORMAL
EOSINOPHIL # BLD: 0.1 K/UL (ref 0–0.4)
EOSINOPHIL NFR BLD: 4 % (ref 0–5)
ERYTHROCYTE [DISTWIDTH] IN BLOOD BY AUTOMATED COUNT: 12.3 % (ref 11.6–14.5)
EST. AVERAGE GLUCOSE BLD GHB EST-MCNC: 114 MG/DL
GLOBULIN SER CALC-MCNC: 4.4 G/DL (ref 2–4)
GLUCOSE SERPL-MCNC: 85 MG/DL (ref 74–99)
HBA1C MFR BLD: 5.6 % (ref 4.2–5.6)
HCT VFR BLD AUTO: 37.3 % (ref 36–48)
HDLC SERPL-MCNC: 77 MG/DL (ref 40–60)
HDLC SERPL: 1.5 {RATIO} (ref 0–5)
HGB BLD-MCNC: 11.8 G/DL (ref 13–16)
IMM GRANULOCYTES # BLD AUTO: 0 K/UL (ref 0–0.04)
IMM GRANULOCYTES NFR BLD AUTO: 0 % (ref 0–0.5)
LDLC SERPL CALC-MCNC: 35.8 MG/DL (ref 0–100)
LIPID PROFILE,FLP: ABNORMAL
LYMPHOCYTES # BLD: 0.6 K/UL (ref 0.9–3.6)
LYMPHOCYTES NFR BLD: 19 % (ref 21–52)
MCH RBC QN AUTO: 28.2 PG (ref 24–34)
MCHC RBC AUTO-ENTMCNC: 31.6 G/DL (ref 31–37)
MCV RBC AUTO: 89.2 FL (ref 78–100)
MONOCYTES # BLD: 0.3 K/UL (ref 0.05–1.2)
MONOCYTES NFR BLD: 11 % (ref 3–10)
NEUTS SEG # BLD: 1.9 K/UL (ref 1.8–8)
NEUTS SEG NFR BLD: 66 % (ref 40–73)
NRBC # BLD: 0 K/UL (ref 0–0.01)
NRBC BLD-RTO: 0 PER 100 WBC
PLATELET # BLD AUTO: 85 K/UL (ref 135–420)
PMV BLD AUTO: 10.1 FL (ref 9.2–11.8)
POTASSIUM SERPL-SCNC: 4.1 MMOL/L (ref 3.5–5.5)
PROT SERPL-MCNC: 8.6 G/DL (ref 6.4–8.2)
RBC # BLD AUTO: 4.18 M/UL (ref 4.35–5.65)
SODIUM SERPL-SCNC: 139 MMOL/L (ref 136–145)
TRIGL SERPL-MCNC: 31 MG/DL (ref ?–150)
VLDLC SERPL CALC-MCNC: 6.2 MG/DL
WBC # BLD AUTO: 3 K/UL (ref 4.6–13.2)

## 2021-12-09 PROCEDURE — 85025 COMPLETE CBC W/AUTO DIFF WBC: CPT

## 2021-12-09 PROCEDURE — 36415 COLL VENOUS BLD VENIPUNCTURE: CPT

## 2021-12-09 PROCEDURE — 80061 LIPID PANEL: CPT

## 2021-12-09 PROCEDURE — 83036 HEMOGLOBIN GLYCOSYLATED A1C: CPT

## 2021-12-09 PROCEDURE — 80053 COMPREHEN METABOLIC PANEL: CPT

## 2021-12-16 ENCOUNTER — OFFICE VISIT (OUTPATIENT)
Dept: FAMILY MEDICINE CLINIC | Age: 81
End: 2021-12-16
Payer: MEDICARE

## 2021-12-16 VITALS
SYSTOLIC BLOOD PRESSURE: 111 MMHG | WEIGHT: 124.4 LBS | RESPIRATION RATE: 18 BRPM | HEIGHT: 68 IN | OXYGEN SATURATION: 91 % | HEART RATE: 83 BPM | BODY MASS INDEX: 18.85 KG/M2 | DIASTOLIC BLOOD PRESSURE: 48 MMHG | TEMPERATURE: 98.6 F

## 2021-12-16 DIAGNOSIS — I10 ESSENTIAL HYPERTENSION WITH GOAL BLOOD PRESSURE LESS THAN 130/80: ICD-10-CM

## 2021-12-16 DIAGNOSIS — D72.810 LYMPHOPENIA: ICD-10-CM

## 2021-12-16 DIAGNOSIS — E78.2 MIXED HYPERLIPIDEMIA: ICD-10-CM

## 2021-12-16 DIAGNOSIS — R05.9 COUGH: ICD-10-CM

## 2021-12-16 DIAGNOSIS — R73.03 PREDIABETES: ICD-10-CM

## 2021-12-16 DIAGNOSIS — D69.6 THROMBOCYTOPENIA (HCC): ICD-10-CM

## 2021-12-16 DIAGNOSIS — Z00.00 MEDICARE ANNUAL WELLNESS VISIT, SUBSEQUENT: Primary | ICD-10-CM

## 2021-12-16 PROCEDURE — G0439 PPPS, SUBSEQ VISIT: HCPCS | Performed by: NURSE PRACTITIONER

## 2021-12-16 PROCEDURE — G8752 SYS BP LESS 140: HCPCS | Performed by: NURSE PRACTITIONER

## 2021-12-16 PROCEDURE — 1101F PT FALLS ASSESS-DOCD LE1/YR: CPT | Performed by: NURSE PRACTITIONER

## 2021-12-16 PROCEDURE — G8427 DOCREV CUR MEDS BY ELIG CLIN: HCPCS | Performed by: NURSE PRACTITIONER

## 2021-12-16 PROCEDURE — G8536 NO DOC ELDER MAL SCRN: HCPCS | Performed by: NURSE PRACTITIONER

## 2021-12-16 PROCEDURE — G8420 CALC BMI NORM PARAMETERS: HCPCS | Performed by: NURSE PRACTITIONER

## 2021-12-16 PROCEDURE — G8754 DIAS BP LESS 90: HCPCS | Performed by: NURSE PRACTITIONER

## 2021-12-16 PROCEDURE — 99214 OFFICE O/P EST MOD 30 MIN: CPT | Performed by: NURSE PRACTITIONER

## 2021-12-16 PROCEDURE — G8432 DEP SCR NOT DOC, RNG: HCPCS | Performed by: NURSE PRACTITIONER

## 2021-12-16 RX ORDER — BENZONATATE 200 MG/1
200 CAPSULE ORAL
Qty: 90 CAPSULE | Refills: 1 | Status: SHIPPED | OUTPATIENT
Start: 2021-12-16

## 2021-12-16 NOTE — PROGRESS NOTES
Room 8    Did patient bring someone? No    Did the patient have DME equipment? No     Did you take your medication today? Yes       1. Have you been to the ER, urgent care clinic since your last visit? Hospitalized since your last visit? No    2. Have you seen or consulted any other health care providers outside of the [address] [place] [address] since your last visit? Include any pap smears or colon screening.  No    3 most recent PHQ Screens 12/16/2021   Little interest or pleasure in doing things Not at all   Feeling down, depressed, irritable, or hopeless Several days   Total Score PHQ 2 1         Health Maintenance Due   Topic Date Due    COVID-19 Vaccine (1) Never done    Shingrix Vaccine Age 50> (1 of 2) Never done    Medicare Yearly Exam  12/25/2021       Learning Assessment 2/9/2021   PRIMARY LEARNER Patient   HIGHEST LEVEL OF EDUCATION - PRIMARY LEARNER  GRADUATED HIGH SCHOOL OR GED   BARRIERS PRIMARY LEARNER NONE   CO-LEARNER CAREGIVER No   PRIMARY LANGUAGE ENGLISH   LEARNER PREFERENCE PRIMARY LISTENING   ANSWERED BY Caleb BROWN   RELATIONSHIP SELF

## 2021-12-16 NOTE — PATIENT INSTRUCTIONS
Medicare Wellness Visit, Male    The best way to live healthy is to have a lifestyle where you eat a well-balanced diet, exercise regularly, limit alcohol use, and quit all forms of tobacco/nicotine, if applicable. Regular preventive services are another way to keep healthy. Preventive services (vaccines, screening tests, monitoring & exams) can help personalize your care plan, which helps you manage your own care. Screening tests can find health problems at the earliest stages, when they are easiest to treat. [facility] follows the current, evidence-based guidelines published by the [facility] Sup[facility] ([facility]) when recommending preventive services for our patients. Because we follow these guidelines, sometimes recommendations change over time as research supports it. (For example, a prostate screening blood test is no longer routinely recommended for men with no symptoms). Of course, you and your doctor may decide to screen more often for some diseases, based on your risk and co-morbidities (chronic disease you are already diagnosed with). Preventive services for you include:  - Medicare offers their members a free annual wellness visit, which is time for you and your primary care provider to discuss and plan for your preventive service needs. Take advantage of this benefit every year!  -All adults over age 72 should receive the recommended pneumonia vaccines. Current USPSTF guidelines recommend a series of two vaccines for the best pneumonia protection.   -All adults should have a flu vaccine yearly and tetanus vaccine every 10 years.  -All adults age 48 and older should receive the shingles vaccines (series of two vaccines).        -All adults age 38-68 who are overweight should have a diabetes screening test once every three years.   -Other screening tests & preventive services for persons with diabetes include: an eye exam to screen for diabetic retinopathy, a kidney function test, a foot exam, and stricter control over your cholesterol.   -Cardiovascular screening for adults with routine risk involves an electrocardiogram (ECG) at intervals determined by the provider.   -Colorectal cancer screening should be done for adults age 54-65 with no increased risk factors for colorectal cancer. There are a number of acceptable methods of screening for this type of cancer. Each test has its own benefits and drawbacks. Discuss with your provider what is most appropriate for you during your annual wellness visit. The different tests include: colonoscopy (considered the best screening method), a fecal occult blood test, a fecal DNA test, and sigmoidoscopy.  -All adults born between [facility] should be screened once for Hepatitis C.  -An Abdominal Aortic Aneurysm (AAA) Screening is recommended for men age 73-68 who has ever smoked in their lifetime.      Here is a list of your current Health Maintenance items (your personalized list of preventive services) with a due date:  Health Maintenance Due   Topic Date Due    COVID-19 Vaccine (1) Never done    Shingles Vaccine (1 of 2) Never done

## 2021-12-16 NOTE — PROGRESS NOTES
Ahmet               [address]               [phone]      Glendon Lesch is a 80 y.o. male and presents with Annual Wellness Visit, Follow Up Chronic Condition, Hypertension, and Cholesterol Problem       Assessment/Plan:    Diagnoses and all orders for this visit:    1. Medicare annual wellness visit, subsequent  Completed today to include ETOH and depression screening and DDNR in place   2. Essential hypertension with goal blood pressure less than 054/94  -     METABOLIC PANEL, COMPREHENSIVE; Future  -     METABOLIC PANEL, COMPREHENSIVE; Future   Endorses medication compliance, Follow up labs prior to next visit, Follow-up labs today and Blood pressure stable, continue same medications   3. Mixed hyperlipidemia  -     LIPID PANEL; Future  -     LIPID PANEL; Future  Endorses medication compliance, Follow up labs prior to next visit, Follow-up labs today and Denies abdominal pain or symptoms of myalgia   4. Prediabetes  -     HEMOGLOBIN A1C WITH EAG; Future  Follow up labs prior to next visit   5. Cough  -     benzonatate (TESSALON) 200 mg capsule; Take 1 Capsule by mouth two (2) times daily as needed for Cough. Refill provided   6. Thrombocytopenia (HCC)  -     CBC WITH AUTOMATED DIFF; Future  WBC and platlets low, platlets now 85, could be r/t his sinemet, review of chart shows problem as far back as 2015  Will contact neurology for input  Refer to hematology as needed  7. Lymphopenia  -     CBC WITH AUTOMATED DIFF; Future        Follow-up and Dispositions    · Return in about 4 months (around 4/16/2022) for HTN, HLD, 15 min, office only, with, labs prior.            Health Maintenance:   Health Maintenance   Topic Date Due    COVID-19 Vaccine (1) Never done    Shingrix Vaccine Age 50> (1 of 2) Never done    Lipid Screen  12/09/2022    Medicare Yearly Exam  12/17/2022    DTaP/Tdap/Td series (2 - Td or Tdap) 08/04/2028    Flu Vaccine  Completed    Pneumococcal 65+ years Completed        Subjective:    Hematological disorder  Long standing leukopenia, review of the chart shows this as far back as 2015  Thrombocytopenia: review of the chart shows this as far back as 2015  Current labs prior to todays visit:   Results for Hailee Zavala (MRN [id number]) as of 1/2/2022 20:58   Ref. Range 12/9/2021 12:21   WBC Latest Ref Range: 4.6 - 13.2 K/uL 3.0 (L)   NRBC Latest Ref Range: 0  WBC 0.0   RBC Latest Ref Range: 4.35 - 5.65 M/uL 4.18 (L)   HGB Latest Ref Range: 13.0 - 16.0 g/dL 11.8 (L)   HCT Latest Ref Range: 36.0 - 48.0 % 37.3   MCV Latest Ref Range: 78.0 - 100.0 FL 89.2   MCH Latest Ref Range: 24.0 - 34.0 PG 28.2   MCHC Latest Ref Range: 31.0 - 37.0 g/dL 31.6   RDW Latest Ref Range: 11.6 - 14.5 % 12.3   PLATELET Latest Ref Range: 135 - 420 K/uL 85 (L)   MPV Latest Ref Range: 9.2 - 11.8 FL 10.1   NEUTROPHILS Latest Ref Range: 40 - 73 % 66   LYMPHOCYTES Latest Ref Range: 21 - 52 % 19 (L)   MONOCYTES Latest Ref Range: 3 - 10 % 11 (H)   EOSINOPHILS Latest Ref Range: 0 - 5 % 4   BASOPHILS Latest Ref Range: 0 - 2 % 1   IMMATURE GRANULOCYTES Latest Ref Range: 0.0 - 0.5 % 0   DF Latest Units:   AUTOMATED   ABSOLUTE NRBC Latest Ref Range: 0.00 - 0.01 K/uL 0.00   ABS. NEUTROPHILS Latest Ref Range: 1.8 - 8.0 K/UL 1.9   ABS. IMM. GRANS. Latest Ref Range: 0.00 - 0.04 K/UL 0.0   ABS. LYMPHOCYTES Latest Ref Range: 0.9 - 3.6 K/UL 0.6 (L)   ABS. MONOCYTES Latest Ref Range: 0.05 - 1.2 K/UL 0.3   ABS. EOSINOPHILS Latest Ref Range: 0.0 - 0.4 K/UL 0.1   ABS. BASOPHILS Latest Ref Range: 0.0 - 0.1 K/UL 0.0       DMII-   Patient reports medication compliance currently not on medications  Diabetic diet compliance most of the time  Patient monitors blood sugars regularly does not have a glucometer   Reports am fasting sugars range does not check   Denies hypoglycemic episodes yes  Denies polyuria, polydipsia, paraesthesia, vision changes? yes  Engaging in daily exercise?  No   No Diabetic HM Topics for this patient  Hemoglobin A1c   Date Value Ref Range Status   12/09/2021 5.6 4.2 - 5.6 % Final     Comment:     (NOTE)  HbA1C Interpretive Ranges  <5.7              Normal  5.7 - 6.4         Consider Prediabetes  >6.5              Consider Diabetes     ]  Creatinine, urine   Date Value Ref Range Status   06/08/2010 70.1 30.0 - 125.0 mg/dL Final     Microalbumin/Creat ratio (mg/g creat)   Date Value Ref Range Status   06/08/2010 53 (H) 0 - 30 mg/g Final   12/08/2009 31 (H) 0 - 30 mg/g Final     Key Antihyperglycemic Medications     Patient is on no antihyperglycemic meds. Hypertension:   Patient reports taking medications as instructed. yes   Medication side effects noted. no  Headache upon wakening. no   Home BP monitoring in range of does not check    Do you experience chest pain/pressure or SOB with exertion? no  Maintain a low Sodium diet? yes  Key CAD CHF Meds             rosuvastatin (CRESTOR) 20 mg tablet (Taking) TAKE ONE TABLET BY MOUTH ONCE NIGHTLY    amLODIPine (NORVASC) 5 mg tablet (Taking) TAKE 1 TABLET BY MOUTH DAILY. BUN   Date Value Ref Range Status   12/09/2021 14 7.0 - 18 MG/DL Final     Creatinine   Date Value Ref Range Status   12/09/2021 1.45 (H) 0.6 - 1.3 MG/DL Final     GFR est AA   Date Value Ref Range Status   12/09/2021 57 (L) >60 ml/min/1.73m2 Final     Potassium   Date Value Ref Range Status   12/09/2021 4.1 3.5 - 5.5 mmol/L Final       HLD:  Has been compliant with meds  Yes  Compliant with low-fat diet. most of the time    Denies myalgias or other side effects. yes  The ASCVD Risk score (Elvis Stevens et al., 2013) failed to calculate for the following reasons:     The 2013 ASCVD risk score is only valid for ages 36 to 78    Cholesterol, total   Date Value Ref Range Status   12/09/2021 119 <200 MG/DL Final     Triglyceride   Date Value Ref Range Status   12/09/2021 31 <150 MG/DL Final     Comment:     The drugs N-acetylcysteine (NAC) and  Metamiszole have been found to cause falsely  low results in this chemical assay. Please  be sure to submit blood samples obtained  BEFORE administration of either of these  drugs to assure correct results. HDL Cholesterol   Date Value Ref Range Status   12/09/2021 77 (H) 40 - 60 MG/DL Final     LDL, calculated   Date Value Ref Range Status   12/09/2021 35.8 0 - 100 MG/DL Final   ]  Key Antihyperlipidemia Meds             rosuvastatin (CRESTOR) 20 mg tablet (Taking) TAKE ONE TABLET BY MOUTH ONCE NIGHTLY           ROS:     ROS   As stated in HPI, otherwise all others negative. The problem list was updated as a part of today's visit. 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 SH:  Social History     Tobacco Use    Smoking status: Never Smoker    Smokeless tobacco: Never Used   Vaping Use    Vaping Use: Never used   Substance Use Topics    Alcohol use: No    Drug use: Never       Medications/Allergies:  Current Outpatient Medications on File Prior to Visit   Medication Sig Dispense Refill    rosuvastatin (CRESTOR) 20 mg tablet TAKE ONE TABLET BY MOUTH ONCE NIGHTLY 90 Tablet 1    amLODIPine (NORVASC) 5 mg tablet TAKE 1 TABLET BY MOUTH DAILY.  90 Tablet 0    senna-docusate (Senexon-S) 8.6-50 mg per tablet TAKE ONE TABLET BY MOUTH DAILY 90 Tablet 3    carbidopa-levodopa (SINEMET)  mg per tablet TAKE ONE TABLET BY MOUTH THREE TIMES A  Tab 3 No current facility-administered medications on file prior to visit. Allergies   Allergen Reactions    Lisinopril Unknown (comments)       Objective:  Visit Vitals  BP (!) 111/48 (BP 1 Location: Left arm, BP Patient Position: Sitting, BP Cuff Size: Adult)   Pulse 83   Temp 98.6 °F (37 °C) (Temporal)   Resp 18   Ht 5' 8\" (1.727 m)   Wt 124 lb 6.4 oz (56.4 kg)   SpO2 91%   BMI 18.91 kg/m²    Body mass index is 18.91 kg/m². Physical assessment  Physical Exam  Vitals and nursing note reviewed. Eyes:      Conjunctiva/sclera: Conjunctivae normal.      Pupils: Pupils are equal, round, and reactive to light. Neck:      Vascular: No JVD. Cardiovascular:      Rate and Rhythm: Normal rate and regular rhythm. Heart sounds: Normal heart sounds. No murmur heard. No friction rub. No gallop. Pulmonary:      Effort: Pulmonary effort is normal.      Breath sounds: Normal breath sounds. Musculoskeletal:         General: Normal range of motion. Cervical back: Normal range of motion. Skin:     General: Skin is warm and dry. Neurological:      Mental Status: He is alert and oriented to person, place, and time. Motor: Tremor present.    Psychiatric:         Mood and Affect: Affect normal.         Cognition and Memory: Memory normal.         Judgment: Judgment normal.           Labwork and Ancillary Studies:    CBC w/Diff  Lab Results   Component Value Date/Time    WBC 3.0 (L) 12/09/2021 12:21 PM    HGB 11.8 (L) 12/09/2021 12:21 PM    PLATELET 85 (L) 31/40/9163 12:21 PM         Basic Metabolic Profile  Lab Results   Component Value Date/Time    Sodium 139 12/09/2021 12:21 PM    Potassium 4.1 12/09/2021 12:21 PM    Chloride 103 12/09/2021 12:21 PM    CO2 31 12/09/2021 12:21 PM    Anion gap 5 12/09/2021 12:21 PM    Glucose 85 12/09/2021 12:21 PM    BUN 14 12/09/2021 12:21 PM    Creatinine 1.45 (H) 12/09/2021 12:21 PM    BUN/Creatinine ratio 10 (L) 12/09/2021 12:21 PM    GFR est AA 57 (L) 12/09/2021 12:21 PM    GFR est non-AA 47 (L) 12/09/2021 12:21 PM    Calcium 8.8 12/09/2021 12:21 PM        Cholesterol  Lab Results   Component Value Date/Time    Cholesterol, total 119 12/09/2021 12:21 PM    HDL Cholesterol 77 (H) 12/09/2021 12:21 PM    LDL, calculated 35.8 12/09/2021 12:21 PM    Triglyceride 31 12/09/2021 12:21 PM    CHOL/HDL Ratio 1.5 12/09/2021 12:21 PM           I have discussed the diagnosis with the patient and the intended plan as seen in the above orders. The patient has received an After-Visit Summary and questions were answered concerning future plans. An After Visit Summary was printed and given to the patient. All diagnosis have been discussed with the patient and all of the patient's questions have been answered. Follow-up and Dispositions    · Return in about 4 months (around 4/16/2022) for HTN, HLD, 15 min, office only, with, labs prior. [facility], AGNP-BC  [address] [facility]   [address] [facility] [address] [address]  This is the Subsequent Medicare Annual Wellness Exam, performed 12 months or more after the Initial AWV or the last Subsequent AWV    I have reviewed the patient's medical history in detail and updated the computerized patient record. Assessment/Plan   Education and counseling provided:  Are appropriate based on today's review and evaluation    1. Medicare annual wellness visit, subsequent  2. Essential hypertension with goal blood pressure less than 916/55  -     METABOLIC PANEL, COMPREHENSIVE; Future  -     METABOLIC PANEL, COMPREHENSIVE; Future  3. Mixed hyperlipidemia  -     LIPID PANEL; Future  -     LIPID PANEL; Future  4. Prediabetes  -     HEMOGLOBIN A1C WITH EAG; Future  5. Cough  -     benzonatate (TESSALON) 200 mg capsule; Take 1 Capsule by mouth two (2) times daily as needed for Cough., Normal, Disp-90 Capsule, R-1  6.  Thrombocytopenia (HCC)  -     CBC WITH AUTOMATED DIFF; Future  7. Lymphopenia  -     CBC WITH AUTOMATED DIFF; Future       Depression Risk Factor Screening     3 most recent PHQ Screens 12/16/2021   Little interest or pleasure in doing things Not at all   Feeling down, depressed, irritable, or hopeless Several days   Total Score PHQ 2 1       Alcohol Risk Screen    Do you average more than 1 drink per night or more than 7 drinks a week: No    In the past three months have you have had more than 4 drinks containing alcohol on one occasion: No        Functional Ability and Level of Safety    Hearing: Hearing is good. Activities of Daily Living: The home contains: no safety equipment. Patient needs help with:  transportation, shopping, preparing meals, laundry, housework, managing medications and managing money      Ambulation: with mild difficulty     Fall Risk:  Fall Risk Assessment, last 12 mths 12/16/2021   Able to walk? Yes   Fall in past 12 months? 0   Do you feel unsteady? 0   Are you worried about falling 0   Number of falls in past 12 months -   Fall with injury?  -      Abuse Screen:  Patient is not abused       Cognitive Screening    Has your family/caregiver stated any concerns about your memory: yes - sometimes he cannot remember certain things but then later he remembers     Cognitive Screening: Abnormal - three word recall: 0/3    Health Maintenance Due     Health Maintenance Due   Topic Date Due    COVID-19 Vaccine (1) Never done    Shingrix Vaccine Age 50> (1 of 2) Never done       Patient Care Team   Patient Care Team:  Aziza Mueller NP as PCP - General (Nurse Practitioner)  Aziza Mueller NP as PCP - [facility] Empaneled Provider  Other, MD Aureliano    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    Essential hypertension with goal blood pressure less than 130/80 I10    Parkinson's disease ([facility] [address] G20    Prediabetes R73.03    History of pulmonary embolism Z86.711    Dementia with behavioral disturbance (HCC) F03.91    CKD (chronic kidney disease) stage 3, GFR 30-59 ml/min ([facility]) N18.30    Protein-calorie malnutrition (HCC) E46    Mixed hyperlipidemia E78.2    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 No pertinent surgical history. Current Outpatient Medications   Medication Sig Dispense Refill    benzonatate (TESSALON) 200 mg capsule Take 1 Capsule by mouth two (2) times daily as needed for Cough. 90 Capsule 1    rosuvastatin (CRESTOR) 20 mg tablet TAKE ONE TABLET BY MOUTH ONCE NIGHTLY 90 Tablet 1    amLODIPine (NORVASC) 5 mg tablet TAKE 1 TABLET BY MOUTH DAILY.  90 Tablet 0    senna-docusate (Senexon-S) 8.6-50 mg per tablet TAKE ONE TABLET BY MOUTH DAILY 90 Tablet 3    carbidopa-levodopa (SINEMET)  mg per tablet TAKE ONE TABLET BY MOUTH THREE TIMES A  Tab 3     Allergies   Allergen Reactions    Lisinopril Unknown (comments)       Family History   Problem Relation Age of Onset    No Known Problems Mother     No Known Problems Father     Diabetes Other     Hypertension Other      Social History     Tobacco Use    Smoking status: Never Smoker    Smokeless tobacco: Never Used   Substance Use Topics    Alcohol use: No         Analia Rodriguez NP

## 2022-01-02 PROBLEM — D69.6 THROMBOCYTOPENIA (HCC): Status: ACTIVE | Noted: 2022-01-02

## 2022-01-02 PROBLEM — D72.810 LYMPHOPENIA: Status: ACTIVE | Noted: 2022-01-02

## 2022-01-02 PROBLEM — Z66 DO NOT RESUSCITATE: Status: ACTIVE | Noted: 2022-01-02

## 2022-03-08 DIAGNOSIS — I10 ESSENTIAL HYPERTENSION WITH GOAL BLOOD PRESSURE LESS THAN 130/80: ICD-10-CM

## 2022-03-09 RX ORDER — AMLODIPINE BESYLATE 5 MG/1
TABLET ORAL
Qty: 90 TABLET | Refills: 0 | Status: SHIPPED | OUTPATIENT
Start: 2022-03-09 | End: 2022-06-10

## 2022-03-18 PROBLEM — D69.6 THROMBOCYTOPENIA (HCC): Status: ACTIVE | Noted: 2022-01-02

## 2022-03-19 PROBLEM — K59.00 CONSTIPATION: Status: ACTIVE | Noted: 2021-08-24

## 2022-03-19 PROBLEM — E78.41 ELEVATED LIPOPROTEIN(A): Status: ACTIVE | Noted: 2018-10-18

## 2022-03-19 PROBLEM — Z66 DO NOT RESUSCITATE: Status: ACTIVE | Noted: 2022-01-02

## 2022-03-19 PROBLEM — E46 PROTEIN-CALORIE MALNUTRITION (HCC): Status: ACTIVE | Noted: 2021-02-14

## 2022-03-19 PROBLEM — G20.A1 PARKINSON'S DISEASE: Status: ACTIVE | Noted: 2018-10-18

## 2022-03-19 PROBLEM — R26.89 NEURODEGENERATIVE GAIT DISORDER: Status: ACTIVE | Noted: 2021-05-25

## 2022-03-19 PROBLEM — R73.03 PREDIABETES: Status: ACTIVE | Noted: 2020-12-23

## 2022-03-19 PROBLEM — E78.2 MIXED HYPERLIPIDEMIA: Status: ACTIVE | Noted: 2021-08-11

## 2022-03-20 PROBLEM — D72.810 LYMPHOPENIA: Status: ACTIVE | Noted: 2022-01-02

## 2022-03-20 PROBLEM — I10 ESSENTIAL HYPERTENSION WITH GOAL BLOOD PRESSURE LESS THAN 130/80: Status: ACTIVE | Noted: 2018-10-18

## 2022-04-08 ENCOUNTER — HOSPITAL ENCOUNTER (OUTPATIENT)
Dept: LAB | Age: 82
Discharge: HOME | End: 2022-04-08
Payer: MEDICARE

## 2022-04-08 LAB
ALBUMIN SERPL-MCNC: 3.9 G/DL (ref 3.4–5)
ALBUMIN/GLOB SERPL: 1 {RATIO} (ref 0.8–1.7)
ALP SERPL-CCNC: 77 U/L (ref 45–117)
ALT SERPL-CCNC: 28 U/L (ref 16–61)
ANION GAP SERPL CALC-SCNC: 5 MMOL/L (ref 3–18)
AST SERPL-CCNC: 54 U/L (ref 10–38)
BASOPHILS # BLD: 0 K/UL (ref 0–0.1)
BASOPHILS NFR BLD: 0 % (ref 0–2)
BILIRUB SERPL-MCNC: 0.6 MG/DL (ref 0.2–1)
BUN SERPL-MCNC: 21 MG/DL (ref 7–18)
BUN/CREAT SERPL: 14 (ref 12–20)
CALCIUM SERPL-MCNC: 9.1 MG/DL (ref 8.5–10.1)
CHLORIDE SERPL-SCNC: 107 MMOL/L (ref 100–111)
CHOLEST SERPL-MCNC: 118 MG/DL
CO2 SERPL-SCNC: 32 MMOL/L (ref 21–32)
CREAT SERPL-MCNC: 1.45 MG/DL (ref 0.6–1.3)
DIFFERENTIAL METHOD BLD: ABNORMAL
EOSINOPHIL # BLD: 0.1 K/UL (ref 0–0.4)
EOSINOPHIL NFR BLD: 4 % (ref 0–5)
ERYTHROCYTE [DISTWIDTH] IN BLOOD BY AUTOMATED COUNT: 12.8 % (ref 11.6–14.5)
EST. AVERAGE GLUCOSE BLD GHB EST-MCNC: 117 MG/DL
GLOBULIN SER CALC-MCNC: 4 G/DL (ref 2–4)
GLUCOSE SERPL-MCNC: 100 MG/DL (ref 74–99)
HBA1C MFR BLD: 5.7 % (ref 4.2–5.6)
HCT VFR BLD AUTO: 32.8 % (ref 36–48)
HDLC SERPL-MCNC: 75 MG/DL (ref 40–60)
HDLC SERPL: 1.6 {RATIO} (ref 0–5)
HGB BLD-MCNC: 10.5 G/DL (ref 13–16)
IMM GRANULOCYTES # BLD AUTO: 0 K/UL (ref 0–0.04)
IMM GRANULOCYTES NFR BLD AUTO: 0 % (ref 0–0.5)
LDLC SERPL CALC-MCNC: 32.4 MG/DL (ref 0–100)
LIPID PROFILE,FLP: ABNORMAL
LYMPHOCYTES # BLD: 0.6 K/UL (ref 0.9–3.6)
LYMPHOCYTES NFR BLD: 21 % (ref 21–52)
MCH RBC QN AUTO: 29.1 PG (ref 24–34)
MCHC RBC AUTO-ENTMCNC: 32 G/DL (ref 31–37)
MCV RBC AUTO: 90.9 FL (ref 78–100)
MONOCYTES # BLD: 0.4 K/UL (ref 0.05–1.2)
MONOCYTES NFR BLD: 14 % (ref 3–10)
NEUTS SEG # BLD: 1.8 K/UL (ref 1.8–8)
NEUTS SEG NFR BLD: 61 % (ref 40–73)
NRBC # BLD: 0 K/UL (ref 0–0.01)
NRBC BLD-RTO: 0 PER 100 WBC
PLATELET # BLD AUTO: 98 K/UL (ref 135–420)
PMV BLD AUTO: 9.7 FL (ref 9.2–11.8)
POTASSIUM SERPL-SCNC: 3.9 MMOL/L (ref 3.5–5.5)
PROT SERPL-MCNC: 7.9 G/DL (ref 6.4–8.2)
RBC # BLD AUTO: 3.61 M/UL (ref 4.35–5.65)
RBC MORPH BLD: ABNORMAL
SODIUM SERPL-SCNC: 144 MMOL/L (ref 136–145)
TRIGL SERPL-MCNC: 53 MG/DL (ref ?–150)
VLDLC SERPL CALC-MCNC: 10.6 MG/DL
WBC # BLD AUTO: 2.9 K/UL (ref 4.6–13.2)

## 2022-04-08 PROCEDURE — 80061 LIPID PANEL: CPT

## 2022-04-08 PROCEDURE — 85025 COMPLETE CBC W/AUTO DIFF WBC: CPT

## 2022-04-08 PROCEDURE — 83036 HEMOGLOBIN GLYCOSYLATED A1C: CPT

## 2022-04-08 PROCEDURE — 36415 COLL VENOUS BLD VENIPUNCTURE: CPT

## 2022-04-08 PROCEDURE — 80053 COMPREHEN METABOLIC PANEL: CPT

## 2022-04-29 DIAGNOSIS — E78.41 ELEVATED LIPOPROTEIN(A): ICD-10-CM

## 2022-05-01 RX ORDER — ROSUVASTATIN CALCIUM 20 MG/1
TABLET, COATED ORAL
Qty: 90 TABLET | Refills: 1 | Status: SHIPPED | OUTPATIENT
Start: 2022-05-01 | End: 2022-10-17

## 2022-05-16 ENCOUNTER — OFFICE VISIT (OUTPATIENT)
Dept: FAMILY MEDICINE CLINIC | Age: 82
End: 2022-05-16

## 2022-06-28 ENCOUNTER — OFFICE VISIT (OUTPATIENT)
Dept: FAMILY MEDICINE CLINIC | Age: 82
End: 2022-06-28
Payer: MEDICARE

## 2022-06-28 VITALS
TEMPERATURE: 98.3 F | WEIGHT: 110 LBS | SYSTOLIC BLOOD PRESSURE: 130 MMHG | DIASTOLIC BLOOD PRESSURE: 54 MMHG | RESPIRATION RATE: 20 BRPM | HEIGHT: 68 IN | BODY MASS INDEX: 16.67 KG/M2 | OXYGEN SATURATION: 99 % | HEART RATE: 75 BPM

## 2022-06-28 DIAGNOSIS — R73.03 PREDIABETES: ICD-10-CM

## 2022-06-28 DIAGNOSIS — I10 ESSENTIAL HYPERTENSION WITH GOAL BLOOD PRESSURE LESS THAN 130/80: Primary | ICD-10-CM

## 2022-06-28 DIAGNOSIS — E46 PROTEIN-CALORIE MALNUTRITION, UNSPECIFIED SEVERITY (HCC): ICD-10-CM

## 2022-06-28 DIAGNOSIS — E78.2 MIXED HYPERLIPIDEMIA: ICD-10-CM

## 2022-06-28 DIAGNOSIS — G20 PARKINSON'S DISEASE (HCC): ICD-10-CM

## 2022-06-28 DIAGNOSIS — N18.31 STAGE 3A CHRONIC KIDNEY DISEASE (HCC): ICD-10-CM

## 2022-06-28 DIAGNOSIS — D69.6 THROMBOCYTOPENIA (HCC): ICD-10-CM

## 2022-06-28 DIAGNOSIS — F03.91 DEMENTIA WITH BEHAVIORAL DISTURBANCE, UNSPECIFIED DEMENTIA TYPE: ICD-10-CM

## 2022-06-28 PROCEDURE — 99214 OFFICE O/P EST MOD 30 MIN: CPT | Performed by: NURSE PRACTITIONER

## 2022-06-28 PROCEDURE — G8752 SYS BP LESS 140: HCPCS | Performed by: NURSE PRACTITIONER

## 2022-06-28 PROCEDURE — G8432 DEP SCR NOT DOC, RNG: HCPCS | Performed by: NURSE PRACTITIONER

## 2022-06-28 PROCEDURE — G8754 DIAS BP LESS 90: HCPCS | Performed by: NURSE PRACTITIONER

## 2022-06-28 PROCEDURE — G8427 DOCREV CUR MEDS BY ELIG CLIN: HCPCS | Performed by: NURSE PRACTITIONER

## 2022-06-28 PROCEDURE — G8419 CALC BMI OUT NRM PARAM NOF/U: HCPCS | Performed by: NURSE PRACTITIONER

## 2022-06-28 PROCEDURE — 1123F ACP DISCUSS/DSCN MKR DOCD: CPT | Performed by: NURSE PRACTITIONER

## 2022-06-28 PROCEDURE — 1101F PT FALLS ASSESS-DOCD LE1/YR: CPT | Performed by: NURSE PRACTITIONER

## 2022-06-28 PROCEDURE — G8536 NO DOC ELDER MAL SCRN: HCPCS | Performed by: NURSE PRACTITIONER

## 2022-06-28 NOTE — PROGRESS NOTES
Ahmet               [address]               [phone]      Herminia Wei is a 80 y.o. male and presents with Follow Up Chronic Condition       Assessment/Plan:    Diagnoses and all orders for this visit:    1. Essential hypertension with goal blood pressure less than 956/02  -     METABOLIC PANEL, COMPREHENSIVE; Future  Endorses medication compliance, Follow up labs prior to next visit and Blood pressure stable, continue amlodipine at same dose   2. Mixed hyperlipidemia  -     LIPID PANEL; Future  Endorses medication compliance, Follow up labs prior to next visit and Denies abdominal pain or symptoms of myalgia   3. Prediabetes  -     HEMOGLOBIN A1C WITH EAG; Future  Follow up labs prior to next visit   4. Parkinson's disease ([facility]  Assessment & Plan:   monitored by specialist. No acute findings meriting change in the plan      5. Protein-calorie malnutrition, unspecified severity ([address]  Assessment & Plan:   well controlled, continue current treatment plan      6. Dementia with behavioral disturbance, unspecified dementia type ([address]  Assessment & Plan:   monitored by specialist. No acute findings meriting change in the plan      7. Thrombocytopenia ([address]  Assessment & Plan:   well controlled, continue current treatment plan      8. Stage 3a chronic kidney disease ([address]  Assessment & Plan:   well controlled, continue current treatment plan        Follow-up and Dispositions    · Return in about 4 months (around 10/28/2022) for DM, HTN, HLD, 15 min, office only, with, labs prior.            Health Maintenance:   Health Maintenance   Topic Date Due    COVID-19 Vaccine (1) Never done    Shingrix Vaccine Age 50> (1 of 2) Never done    Depression Screen  12/16/2022    Medicare Yearly Exam  12/17/2022    Lipid Screen  04/08/2023    DTaP/Tdap/Td series (2 - Td or Tdap) 08/04/2028    Flu Vaccine  Completed    Pneumococcal 65+ years  Completed        Subjective:    Labs obtained prior to visit? YES  Reviewed with patient? Yes    Dementia/parkinson's  Has appointment with neurology dr. Madan Fletcher next month  Taking sinemet as ordered    Hypertension:  Visit Vitals  BP (!) 130/54   Pulse 75   Temp 98.3 °F (36.8 °C) (Temporal)   Resp 20   Ht 5' 8\" (1.727 m)   Wt 110 lb (49.9 kg)   SpO2 99%   BMI 16.73 kg/m²      Patient reports taking medications as instructed. yes   Medication side effects noted. no  Headache upon wakening. no   Home BP monitoring: Does not check  Do you experience chest pain/pressure or SOB with exertion? no  Maintain a low Sodium diet? yes, drinking ensure drinks, drinking about 2 a day  Key CAD CHF Meds             amLODIPine (NORVASC) 5 mg tablet (Taking) TAKE ONE TABLET BY MOUTH DAILY    rosuvastatin (CRESTOR) 20 mg tablet (Taking) TAKE ONE TABLET BY MOUTH ONCE NIGHTLY        BUN   Date Value Ref Range Status   04/08/2022 21 (H) 7.0 - 18 MG/DL Final     Creatinine   Date Value Ref Range Status   04/08/2022 1.45 (H) 0.6 - 1.3 MG/DL Final     GFR est AA   Date Value Ref Range Status   04/08/2022 57 (L) >60 ml/min/1.73m2 Final     Potassium   Date Value Ref Range Status   04/08/2022 3.9 3.5 - 5.5 mmol/L Final       HLD:  Has been compliant with meds  Yes  Compliant with low-fat diet. most of the time    Denies myalgias or other side effects. yes  The ASCVD Risk score (Mike Lisa., et al., 2013) failed to calculate for the following reasons: The 2013 ASCVD risk score is only valid for ages 36 to 78    Cholesterol, total   Date Value Ref Range Status   04/08/2022 118 <200 MG/DL Final     Triglyceride   Date Value Ref Range Status   04/08/2022 53 <150 MG/DL Final     Comment:     The drugs N-acetylcysteine (NAC) and  Metamiszole have been found to cause falsely  low results in this chemical assay. Please  be sure to submit blood samples obtained  BEFORE administration of either of these  drugs to assure correct results.        HDL Cholesterol   Date Value Ref Range Status 04/08/2022 75 (H) 40 - 60 MG/DL Final     LDL, calculated   Date Value Ref Range Status   04/08/2022 32.4 0 - 100 MG/DL Final   ]  Key Antihyperlipidemia Meds             rosuvastatin (CRESTOR) 20 mg tablet (Taking) TAKE ONE TABLET BY MOUTH ONCE NIGHTLY           ROS:     ROS  As stated in HPI, otherwise all others negative. The problem list was updated as a part of today's visit. 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 SH:  Social History     Tobacco Use    Smoking status: Never Smoker    Smokeless tobacco: Never Used   Vaping Use    Vaping Use: Never used   Substance Use Topics    Alcohol use: No    Drug use: Never       Medications/Allergies:  Current Outpatient Medications on File Prior to Visit   Medication Sig Dispense Refill    amLODIPine (NORVASC) 5 mg tablet TAKE ONE TABLET BY MOUTH DAILY 90 Tablet 3    rosuvastatin (CRESTOR) 20 mg tablet TAKE ONE TABLET BY MOUTH ONCE NIGHTLY 90 Tablet 1    benzonatate (TESSALON) 200 mg capsule Take 1 Capsule by mouth two (2) times daily as needed for Cough. 90 Capsule 1    senna-docusate (Senexon-S) 8.6-50 mg per tablet TAKE ONE TABLET BY MOUTH DAILY 90 Tablet 3    carbidopa-levodopa (SINEMET)  mg per tablet TAKE ONE TABLET BY MOUTH THREE TIMES A  Tab 3     No current facility-administered medications on file prior to visit.         Allergies   Allergen Reactions    Lisinopril Unknown (comments)       Objective:  Visit Vitals  BP (!) 130/54   Pulse 75   Temp 98.3 °F (36.8 °C) (Temporal)   Resp 20   Ht 5' 8\" (1.727 m)   Wt 110 lb (49.9 kg)   SpO2 99%   BMI 16.73 kg/m²    Body mass index is 16.73 kg/m². Physical assessment  Physical Exam  Vitals and nursing note reviewed. Eyes:      Conjunctiva/sclera: Conjunctivae normal.      Pupils: Pupils are equal, round, and reactive to light. Cardiovascular:      Rate and Rhythm: Normal rate and regular rhythm. Heart sounds: Normal heart sounds. No murmur heard. No friction rub. No gallop. Pulmonary:      Effort: Pulmonary effort is normal.      Breath sounds: Normal breath sounds. Musculoskeletal:         General: Normal range of motion. Cervical back: Normal range of motion. Skin:     General: Skin is warm and dry. Neurological:      Mental Status: He is alert.       Comments: Speech is garbled, all information for todays visit comes from his wife           Labwork and Ancillary Studies:    CBC w/Diff  Lab Results   Component Value Date/Time    WBC 2.9 (L) 04/08/2022 09:47 AM    HGB 10.5 (L) 04/08/2022 09:47 AM    PLATELET 98 (L) 67/88/6873 09:47 AM         Basic Metabolic Profile  Lab Results   Component Value Date/Time    Sodium 144 04/08/2022 09:47 AM    Potassium 3.9 04/08/2022 09:47 AM    Chloride 107 04/08/2022 09:47 AM    CO2 32 04/08/2022 09:47 AM    Anion gap 5 04/08/2022 09:47 AM    Glucose 100 (H) 04/08/2022 09:47 AM    BUN 21 (H) 04/08/2022 09:47 AM    Creatinine 1.45 (H) 04/08/2022 09:47 AM    BUN/Creatinine ratio 14 04/08/2022 09:47 AM    GFR est AA 57 (L) 04/08/2022 09:47 AM    GFR est non-AA 47 (L) 04/08/2022 09:47 AM    Calcium 9.1 04/08/2022 09:47 AM        Cholesterol  Lab Results   Component Value Date/Time    Cholesterol, total 118 04/08/2022 09:47 AM    HDL Cholesterol 75 (H) 04/08/2022 09:47 AM    LDL, calculated 32.4 04/08/2022 09:47 AM    Triglyceride 53 04/08/2022 09:47 AM CHOL/HDL Ratio 1.6 04/08/2022 09:47 AM           I have discussed the diagnosis with the patient and the intended plan as seen in the above orders. The patient has received an After-Visit Summary and questions were answered concerning future plans. An After Visit Summary was printed and given to the patient. All diagnosis have been discussed with the patient and all of the patient's questions have been answered. Follow-up and Dispositions    · Return in about 4 months (around 10/28/2022) for DM, HTN, HLD, 15 min, office only, with, labs prior. Katelyn Carter, [facility]  [address] [facility]   [address] [facility] [address]  [id number]

## 2022-06-28 NOTE — PROGRESS NOTES
Room 7    When asked if patient has any concerns he would like to address with VINOD Boone patient states  no . Did patient bring someone? Yes: Comment: Care Giver     Did the patient have DME equipment? No     Did you take your medication today? Yes       1. \"Have you been to the ER, urgent care clinic since your last visit? Hospitalized since your last visit? \" No    2. \"Have you seen or consulted any other health care providers outside of the [address] [place] [address] since your last visit? \" No     3. For patients aged 39-70: Has the patient had a colonoscopy / FIT/ Cologuard? NA - based on age      If the patient is female:    4. For patients aged 41-77: Has the patient had a mammogram within the past 2 years? NA - based on age or sex      11. For patients aged 21-65: Has the patient had a pap smear?  NA - based on age or sex        1 most recent PHQ Screens 12/16/2021   Little interest or pleasure in doing things Not at all   Feeling down, depressed, irritable, or hopeless Several days   Total Score PHQ 2 1         Health Maintenance Due   Topic Date Due    COVID-19 Vaccine (1) Never done    Shingrix Vaccine Age 50> (1 of 2) Never done       Learning Assessment 2/9/2021   PRIMARY LEARNER Patient   HIGHEST LEVEL OF EDUCATION - PRIMARY LEARNER  GRADUATED HIGH SCHOOL OR GED   BARRIERS PRIMARY LEARNER NONE   CO-LEARNER CAREGIVER No   PRIMARY LANGUAGE ENGLISH   LEARNER PREFERENCE PRIMARY LISTENING   ANSWERED BY Todd BROWN   RELATIONSHIP SELF

## 2022-09-28 DIAGNOSIS — G20 PARKINSON'S SYNDROME (HCC): ICD-10-CM

## 2022-09-28 RX ORDER — CARBIDOPA AND LEVODOPA 25; 100 MG/1; MG/1
1 TABLET ORAL 3 TIMES DAILY
Qty: 270 TABLET | Refills: 3 | Status: SHIPPED | OUTPATIENT
Start: 2022-09-28

## 2022-10-19 ENCOUNTER — HOSPITAL ENCOUNTER (OUTPATIENT)
Dept: LAB | Age: 82
Discharge: HOME | End: 2022-10-19
Payer: MEDICARE

## 2022-10-19 LAB
ALBUMIN SERPL-MCNC: 3.7 G/DL (ref 3.4–5)
ALBUMIN/GLOB SERPL: 1 {RATIO} (ref 0.8–1.7)
ALP SERPL-CCNC: 80 U/L (ref 45–117)
ALT SERPL-CCNC: 47 U/L (ref 16–61)
ANION GAP SERPL CALC-SCNC: 4 MMOL/L (ref 3–18)
AST SERPL-CCNC: 41 U/L (ref 10–38)
BILIRUB SERPL-MCNC: 0.5 MG/DL (ref 0.2–1)
BUN SERPL-MCNC: 23 MG/DL (ref 7–18)
BUN/CREAT SERPL: 17 (ref 12–20)
CALCIUM SERPL-MCNC: 9 MG/DL (ref 8.5–10.1)
CHLORIDE SERPL-SCNC: 106 MMOL/L (ref 100–111)
CHOLEST SERPL-MCNC: 128 MG/DL
CO2 SERPL-SCNC: 34 MMOL/L (ref 21–32)
CREAT SERPL-MCNC: 1.35 MG/DL (ref 0.6–1.3)
EST. AVERAGE GLUCOSE BLD GHB EST-MCNC: 120 MG/DL
GLOBULIN SER CALC-MCNC: 3.8 G/DL (ref 2–4)
GLUCOSE SERPL-MCNC: 114 MG/DL (ref 74–99)
HBA1C MFR BLD: 5.8 % (ref 4.2–5.6)
HDLC SERPL-MCNC: 87 MG/DL (ref 40–60)
HDLC SERPL: 1.5 {RATIO} (ref 0–5)
LDLC SERPL CALC-MCNC: 34.8 MG/DL (ref 0–100)
LIPID PROFILE,FLP: ABNORMAL
POTASSIUM SERPL-SCNC: 4.3 MMOL/L (ref 3.5–5.5)
PROT SERPL-MCNC: 7.5 G/DL (ref 6.4–8.2)
SODIUM SERPL-SCNC: 144 MMOL/L (ref 136–145)
TRIGL SERPL-MCNC: 31 MG/DL (ref ?–150)
VLDLC SERPL CALC-MCNC: 6.2 MG/DL

## 2022-10-19 PROCEDURE — 80061 LIPID PANEL: CPT

## 2022-10-19 PROCEDURE — 36415 COLL VENOUS BLD VENIPUNCTURE: CPT

## 2022-10-19 PROCEDURE — 83036 HEMOGLOBIN GLYCOSYLATED A1C: CPT

## 2022-10-19 PROCEDURE — 80053 COMPREHEN METABOLIC PANEL: CPT

## 2022-12-07 ENCOUNTER — OFFICE VISIT (OUTPATIENT)
Dept: FAMILY MEDICINE CLINIC | Age: 82
End: 2022-12-07
Payer: MEDICARE

## 2022-12-07 VITALS
HEIGHT: 68 IN | OXYGEN SATURATION: 99 % | TEMPERATURE: 98.2 F | DIASTOLIC BLOOD PRESSURE: 58 MMHG | SYSTOLIC BLOOD PRESSURE: 114 MMHG | BODY MASS INDEX: 18.04 KG/M2 | HEART RATE: 75 BPM | RESPIRATION RATE: 18 BRPM | WEIGHT: 119 LBS

## 2022-12-07 DIAGNOSIS — E78.2 MIXED HYPERLIPIDEMIA: ICD-10-CM

## 2022-12-07 DIAGNOSIS — I10 ESSENTIAL HYPERTENSION WITH GOAL BLOOD PRESSURE LESS THAN 130/80: ICD-10-CM

## 2022-12-07 DIAGNOSIS — Z00.00 MEDICARE ANNUAL WELLNESS VISIT, SUBSEQUENT: Primary | ICD-10-CM

## 2022-12-07 NOTE — PATIENT INSTRUCTIONS
Medicare Wellness Visit, Male    The best way to live healthy is to have a lifestyle where you eat a well-balanced diet, exercise regularly, limit alcohol use, and quit all forms of tobacco/nicotine, if applicable. Regular preventive services are another way to keep healthy. Preventive services (vaccines, screening tests, monitoring & exams) can help personalize your care plan, which helps you manage your own care. Screening tests can find health problems at the earliest stages, when they are easiest to treat. [facility] follows the current, evidence-based guidelines published by the [facility] ([facility]) when recommending preventive services for our patients. Because we follow these guidelines, sometimes recommendations change over time as research supports it. (For example, a prostate screening blood test is no longer routinely recommended for men with no symptoms). Of course, you and your doctor may decide to screen more often for some diseases, based on your risk and co-morbidities (chronic disease you are already diagnosed with). Preventive services for you include:  - Medicare offers their members a free annual wellness visit, which is time for you and your primary care provider to discuss and plan for your preventive service needs.  Take advantage of this benefit every year!    -Over the age of 72 should receive the recommended pneumonia vaccines.   -All adults should have a flu vaccine yearly.  -All adults should receive a tetanus vaccine every 10 years.  -Over the age of 48 should receive the shingles vaccines.    -All adults should be screened once for Hepatitis C.  -All adults age 38-68 who are overweight should have a diabetes screening test once every three years.   -Other screening tests & preventive services for persons with diabetes include: an eye exam to screen for diabetic retinopathy, a kidney function test, a foot exam, and stricter control over your cholesterol.   -Cardiovascular screening for adults with routine risk involves an electrocardiogram (ECG) at intervals determined by the provider.     -Colorectal cancer screening should be done for adults age 43-69 with no increased risk factors for colorectal cancer. There are a number of acceptable methods of screening for this type of cancer. Each test has its own benefits and drawbacks. Discuss with your provider what is most appropriate for you during your annual wellness visit. The different tests include: colonoscopy (considered the best screening method), a fecal occult blood test, a fecal DNA test, and sigmoidoscopy.    -Lung cancer screening is recommended annually with a low dose CT scan for adults between age 54 and 68, who have smoked at least 30 pack years (equivalent of 1 pack per day for 30 days), and who is a current smoker or quit less than 15 years ago. -An Abdominal Aortic Aneurysm (AAA) Screening is recommended for men age 73-68 who has ever smoked in their lifetime.      Here is a list of your current Health Maintenance items (your personalized list of preventive services) with a due date:  Health Maintenance Due   Topic Date Due    COVID-19 Vaccine (1) Never done    Shingles Vaccine (1 of 2) Never done    Yearly Flu Vaccine (1) 08/01/2022    Depresssion Screening  12/16/2022

## 2022-12-07 NOTE — PROGRESS NOTES
Room 8    When asked if patient has any concerns he would like to address with VINOD Boone patient states no . Did patient bring someone? Yes: Comment: caregiver    Did the patient have DME equipment? No     Did you take your medication today? Yes       1. \"Have you been to the ER, urgent care clinic since your last visit? Hospitalized since your last visit? \" No    2. \"Have you seen or consulted any other health care providers outside of the [address] [place] [address] since your last visit? \" No     3. For patients aged 39-70: Has the patient had a colonoscopy / FIT/ Cologuard? NA - based on age      If the patient is female:    4. For patients aged 41-77: Has the patient had a mammogram within the past 2 years? NA - based on age or sex      11. For patients aged 21-65: Has the patient had a pap smear?  NA - based on age or sex        3 most recent PHQ Screens 12/16/2021   Little interest or pleasure in doing things Not at all   Feeling down, depressed, irritable, or hopeless Several days   Total Score PHQ 2 1         Health Maintenance Due   Topic Date Due    COVID-19 Vaccine (1) Never done    Shingrix Vaccine Age 50> (1 of 2) Never done    Flu Vaccine (1) 08/01/2022    Depression Screen  12/16/2022    Medicare Yearly Exam  12/17/2022       Learning Assessment 2/9/2021   PRIMARY LEARNER Patient   HIGHEST LEVEL OF EDUCATION - PRIMARY LEARNER  GRADUATED HIGH SCHOOL OR GED   BARRIERS PRIMARY LEARNER NONE   CO-LEARNER CAREGIVER No   PRIMARY LANGUAGE ENGLISH   LEARNER PREFERENCE PRIMARY LISTENING   ANSWERED BY Beverly BROWN   RELATIONSHIP SELF

## 2022-12-07 NOTE — PROGRESS NOTES
[address] [place] [address]               [phone]      Prabhakar Doyle is a 80 y.o. male and presents with Follow Up Chronic Condition, Cholesterol Problem, Hypertension, and Diabetes       Assessment/Plan:    Diagnoses and all orders for this visit:    1. Medicare annual wellness visit, subsequent  Completed today to include ETOH and depression screening, Healthcare decision maker verified, and patient is DNR, DDNR is on the chart      2. Essential hypertension with goal blood pressure less than 917/23  -     METABOLIC PANEL, COMPREHENSIVE; Future  Endorses medication compliance, Follow up labs prior to next visit, and Blood pressure stable, continue amlodipine at same dose      3. Mixed hyperlipidemia  -     LIPID PANEL; Future  Endorses medication compliance, Follow up labs prior to next visit, and Denies abdominal pain or symptoms of myalgia       Follow-up and Dispositions    Return in about 4 months (around 4/7/2023) for HTN, HLD, 15 min, office only, w/labs prior. Health Maintenance:   Health Maintenance   Topic Date Due    COVID-19 Vaccine (1) Never done    Shingrix Vaccine Age 50> (1 of 2) Never done    Flu Vaccine (1) 08/01/2022    Depression Screen  12/16/2022    Lipid Screen  10/19/2023    Medicare Yearly Exam  12/08/2023    DTaP/Tdap/Td series (2 - Td or Tdap) 08/04/2028    Pneumococcal 65+ years  Completed        Subjective:    Labs obtained prior to visit? YES  Reviewed with patient? Yes    Accompanied today by caregiver: Joan Cushing    Hypertension:  Visit Vitals  BP (!) 114/58 (BP 1 Location: Left arm, BP Patient Position: Sitting, BP Cuff Size: Small adult) Comment (BP Patient Position): feet flat on floor   Pulse 75   Temp 98.2 °F (36.8 °C) (Temporal)   Resp 18   Ht 5' 8\" (1.727 m)   Wt 119 lb (54 kg)   SpO2 99%   BMI 18.09 kg/m²      Patient reports taking medications as instructed. yes   Medication side effects noted. no  Headache upon wakening. no   Home BP monitoring: Does not check  Do you experience chest pain/pressure or SOB with exertion? no  Maintain a low Sodium diet? yes  Key CAD CHF Meds               rosuvastatin (CRESTOR) 20 mg tablet (Taking) TAKE ONE TABLET BY MOUTH ONCE NIGHTLY    amLODIPine (NORVASC) 5 mg tablet (Taking) TAKE ONE TABLET BY MOUTH DAILY          BUN   Date Value Ref Range Status   10/19/2022 23 (H) 7.0 - 18 MG/DL Final     Creatinine   Date Value Ref Range Status   10/19/2022 1.35 (H) 0.6 - 1.3 MG/DL Final     GFR est AA   Date Value Ref Range Status   04/08/2022 57 (L) >60 ml/min/1.73m2 Final     Potassium   Date Value Ref Range Status   10/19/2022 4.3 3.5 - 5.5 mmol/L Final         HLD:  Has been compliant with meds  Yes  Compliant with low-fat diet. most of the time    Denies myalgias or other side effects. yes  The ASCVD Risk score (Terri DK, et al., 2019) failed to calculate for the following reasons: The 2019 ASCVD risk score is only valid for ages 36 to 78    Cholesterol, total   Date Value Ref Range Status   10/19/2022 128 <200 MG/DL Final     Triglyceride   Date Value Ref Range Status   10/19/2022 31 <150 MG/DL Final     Comment:     The drugs N-acetylcysteine (NAC) and  Metamiszole have been found to cause falsely  low results in this chemical assay. Please  be sure to submit blood samples obtained  BEFORE administration of either of these  drugs to assure correct results. HDL Cholesterol   Date Value Ref Range Status   10/19/2022 87 (H) 40 - 60 MG/DL Final     LDL, calculated   Date Value Ref Range Status   10/19/2022 34.8 0 - 100 MG/DL Final   ]  Key Antihyperlipidemia Meds               rosuvastatin (CRESTOR) 20 mg tablet (Taking) TAKE ONE TABLET BY MOUTH ONCE NIGHTLY               ROS:     ROS  As stated in HPI, otherwise all others negative. The problem list was updated as a part of today's visit.   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 SH:  Social History     Tobacco Use    Smoking status: Never    Smokeless tobacco: Never   Vaping Use    Vaping Use: Never used   Substance Use Topics    Alcohol use: No    Drug use: Never       Medications/Allergies:  Current Outpatient Medications on File Prior to Visit   Medication Sig Dispense Refill    rosuvastatin (CRESTOR) 20 mg tablet TAKE ONE TABLET BY MOUTH ONCE NIGHTLY 90 Tablet 3    carbidopa-levodopa (SINEMET)  mg per tablet Take 1 Tablet by mouth three (3) times daily. 270 Tablet 3    amLODIPine (NORVASC) 5 mg tablet TAKE ONE TABLET BY MOUTH DAILY 90 Tablet 3    benzonatate (TESSALON) 200 mg capsule Take 1 Capsule by mouth two (2) times daily as needed for Cough. 90 Capsule 1    senna-docusate (Senexon-S) 8.6-50 mg per tablet TAKE ONE TABLET BY MOUTH DAILY 90 Tablet 3     No current facility-administered medications on file prior to visit. Allergies   Allergen Reactions    Lisinopril Unknown (comments)       Objective:  Visit Vitals  BP (!) 114/58 (BP 1 Location: Left arm, BP Patient Position: Sitting, BP Cuff Size: Small adult) Comment (BP Patient Position): feet flat on floor   Pulse 75   Temp 98.2 °F (36.8 °C) (Temporal)   Resp 18   Ht 5' 8\" (1.727 m)   Wt 119 lb (54 kg)   SpO2 99%   BMI 18.09 kg/m²    Body mass index is 18.09 kg/m². Physical assessment  Physical Exam  Vitals and nursing note reviewed.    Constitutional:       Comments: Speech is mumbled and slow but understandable   Eyes:      Conjunctiva/sclera: Conjunctivae normal.      Pupils: Pupils are equal, round, and reactive to light. Cardiovascular:      Rate and Rhythm: Normal rate and regular rhythm. Heart sounds: Normal heart sounds. No murmur heard. No friction rub. No gallop. Pulmonary:      Effort: Pulmonary effort is normal.      Breath sounds: Normal breath sounds. Musculoskeletal:         General: Normal range of motion. Cervical back: Normal range of motion. Skin:     General: Skin is warm and dry. Neurological:      Mental Status: He is alert. Labwork and Ancillary Studies:    CBC w/Diff  Lab Results   Component Value Date/Time    WBC 2.9 (L) 04/08/2022 09:47 AM    HGB 10.5 (L) 04/08/2022 09:47 AM    PLATELET 98 (L) 06/09/6980 09:47 AM         Basic Metabolic Profile  Lab Results   Component Value Date/Time    Sodium 144 10/19/2022 09:51 AM    Potassium 4.3 10/19/2022 09:51 AM    Chloride 106 10/19/2022 09:51 AM    CO2 34 (H) 10/19/2022 09:51 AM    Anion gap 4 10/19/2022 09:51 AM    Glucose 114 (H) 10/19/2022 09:51 AM    BUN 23 (H) 10/19/2022 09:51 AM    Creatinine 1.35 (H) 10/19/2022 09:51 AM    BUN/Creatinine ratio 17 10/19/2022 09:51 AM    GFR est AA 57 (L) 04/08/2022 09:47 AM    GFR est non-AA 47 (L) 04/08/2022 09:47 AM    Calcium 9.0 10/19/2022 09:51 AM        Cholesterol  Lab Results   Component Value Date/Time    Cholesterol, total 128 10/19/2022 09:51 AM    HDL Cholesterol 87 (H) 10/19/2022 09:51 AM    LDL, calculated 34.8 10/19/2022 09:51 AM    Triglyceride 31 10/19/2022 09:51 AM    CHOL/HDL Ratio 1.5 10/19/2022 09:51 AM           I have discussed the diagnosis with the patient and the intended plan as seen in the above orders. The patient has received an After-Visit Summary and questions were answered concerning future plans. An After Visit Summary was printed and given to the patient.     All diagnosis have been discussed with the patient and all of the patient's questions have been answered. Follow-up and Dispositions    Return in about 4 months (around 4/7/2023) for HTN, HLD, 15 min, office only, w/labs prior. Jen Reyna, [facility]  [address] [facility]   [address] [facility] [place] [address] [address]          This is the Subsequent Medicare Annual Wellness Exam, performed 12 months or more after the Initial AWV or the last Subsequent AWV    I have reviewed the patient's medical history in detail and updated the computerized patient record. Assessment/Plan   Education and counseling provided:  Are appropriate based on today's review and evaluation    1. Medicare annual wellness visit, subsequent  2. Essential hypertension with goal blood pressure less than 332/48  -     METABOLIC PANEL, COMPREHENSIVE; Future  3. Mixed hyperlipidemia  -     LIPID PANEL; Future       Depression Risk Factor Screening     3 most recent PHQ Screens 12/16/2021   Little interest or pleasure in doing things Not at all   Feeling down, depressed, irritable, or hopeless Several days   Total Score PHQ 2 1       Alcohol & Drug Abuse Risk Screen    Do you average more than 1 drink per night or more than 7 drinks a week: No    In the past three months have you have had more than 4 drinks containing alcohol on one occasion: No          Functional Ability and Level of Safety    Hearing: Hearing is good. Activities of Daily Living: The home contains: no safety equipment. Patient needs help with:  transportation, shopping, preparing meals, laundry, housework, managing medications, managing money, and walking      Ambulation: with difficulty, uses a walker     Fall Risk:  Fall Risk Assessment, last 12 mths 6/28/2022   Able to walk? Yes   Fall in past 12 months? 0   Do you feel unsteady? 0   Are you worried about falling 0   Number of falls in past 12 months -   Fall with injury?  -      Abuse Screen:  Patient is not abused       Cognitive Screening    Has your family/caregiver stated any concerns about your memory: no     Cognitive Screening: Abnormal - Clock Drawing Test    Health Maintenance Due     Health Maintenance Due   Topic Date Due    COVID-19 Vaccine (1) Never done    Shingrix Vaccine Age 49> (1 of 2) Never done    Flu Vaccine (1) 08/01/2022    Depression Screen  12/16/2022       Patient Care Team   Patient Care Team:  Kristie Bernal NP as PCP - General (Nurse Practitioner)  Kristie Bernal NP as PCP - [facility] Empaneled Provider  Other, MD Aureliano    History     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    Essential hypertension with goal blood pressure less than 130/80 I10    Parkinson's disease ([address] G20    Prediabetes R73.03    History of pulmonary embolism Z86.711    Dementia with behavioral disturbance F03.918    CKD (chronic kidney disease) stage 3, GFR 30-59 ml/min (HCC) N18.30    Protein-calorie malnutrition ([address] E46    Mixed hyperlipidemia E78.2    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 No pertinent surgical history. Current Outpatient Medications   Medication Sig Dispense Refill    rosuvastatin (CRESTOR) 20 mg tablet TAKE ONE TABLET BY MOUTH ONCE NIGHTLY 90 Tablet 3    carbidopa-levodopa (SINEMET)  mg per tablet Take 1 Tablet by mouth three (3) times daily. 270 Tablet 3    amLODIPine (NORVASC) 5 mg tablet TAKE ONE TABLET BY MOUTH DAILY 90 Tablet 3    benzonatate (TESSALON) 200 mg capsule Take 1 Capsule by mouth two (2) times daily as needed for Cough.  90 Capsule 1    senna-docusate (Senexon-S) 8.6-50 mg per tablet TAKE ONE TABLET BY MOUTH DAILY 90 Tablet 3     Allergies   Allergen Reactions    Lisinopril Unknown (comments)       Family History   Problem Relation Age of Onset    No Known Problems Mother     No Known Problems Father     Diabetes Other     Hypertension Other      Social History     Tobacco Use    Smoking status: Never    Smokeless tobacco: Never   Substance Use Topics    Alcohol use: No         Bedelia Dies, NP

## 2023-09-25 NOTE — TELEPHONE ENCOUNTER
Spoke to Ms. Edison Singleton. Question answered. Medications reviewed. were reviewed with the patient at length. Orders Placed This Encounter   Procedures    CTA HEAD NECK W WO CONTRAST     Standing Status:   Future     Standing Expiration Date:   9/25/2024     Order Specific Question:   STAT Creatinine as needed:     Answer:   Yes          I have spent greater than 50% of visit discussing and counseling of patient  for treatment and diagnostic plan review. Total time45     . Notes: Patient is to continue all medications as directed by prescribing physicians. Continuations on today's visit are made based on the patient's report of current medications.              Dr. Dillon General  Consultation Neurology, Neurodiagnostics and Neurotherapeutics  Neuroelectrophysiology, EEG, EMG  [facility]  [address] [facility], [address] [place] [address], [address] [place] [address]  Phone: [phone] Fax [phone]